# Patient Record
Sex: MALE | Race: WHITE | Employment: FULL TIME | URBAN - METROPOLITAN AREA
[De-identification: names, ages, dates, MRNs, and addresses within clinical notes are randomized per-mention and may not be internally consistent; named-entity substitution may affect disease eponyms.]

---

## 2022-01-13 ENCOUNTER — HOSPITAL ENCOUNTER (OUTPATIENT)
Age: 42
Setting detail: SPECIMEN
Discharge: HOME | End: 2022-01-13

## 2022-01-13 ENCOUNTER — OFFICE VISIT (OUTPATIENT)
Dept: PRIMARY CARE CLINIC | Age: 42
End: 2022-01-13
Payer: COMMERCIAL

## 2022-01-13 ENCOUNTER — TELEPHONE (OUTPATIENT)
Dept: PRIMARY CARE CLINIC | Age: 42
End: 2022-01-13

## 2022-01-13 VITALS
SYSTOLIC BLOOD PRESSURE: 135 MMHG | HEIGHT: 66 IN | HEART RATE: 92 BPM | BODY MASS INDEX: 21.79 KG/M2 | WEIGHT: 135.6 LBS | DIASTOLIC BLOOD PRESSURE: 97 MMHG | TEMPERATURE: 98.1 F | OXYGEN SATURATION: 97 %

## 2022-01-13 DIAGNOSIS — I10 ESSENTIAL HYPERTENSION: ICD-10-CM

## 2022-01-13 DIAGNOSIS — E11.00 TYPE 2 DIABETES MELLITUS WITH HYPEROSMOLARITY WITHOUT COMA, WITH LONG-TERM CURRENT USE OF INSULIN (HCC): ICD-10-CM

## 2022-01-13 DIAGNOSIS — Z13.9 ENCOUNTER FOR SCREENING INVOLVING SOCIAL DETERMINANTS OF HEALTH (SDOH): ICD-10-CM

## 2022-01-13 DIAGNOSIS — Z79.4 TYPE 2 DIABETES MELLITUS WITH HYPEROSMOLARITY WITHOUT COMA, WITH LONG-TERM CURRENT USE OF INSULIN (HCC): ICD-10-CM

## 2022-01-13 DIAGNOSIS — E87.6 HYPOKALEMIA: ICD-10-CM

## 2022-01-13 DIAGNOSIS — Z00.00 ENCOUNTER FOR MEDICAL EXAMINATION TO ESTABLISH CARE: Primary | ICD-10-CM

## 2022-01-13 DIAGNOSIS — Z13.1 DIABETES MELLITUS SCREENING: ICD-10-CM

## 2022-01-13 DIAGNOSIS — Z13.220 SCREENING FOR HYPERLIPIDEMIA: ICD-10-CM

## 2022-01-13 DIAGNOSIS — F79 INTELLECTUAL DISABILITY: ICD-10-CM

## 2022-01-13 DIAGNOSIS — Z02.1 DRUG SCREENING, PRE-EMPLOYMENT: ICD-10-CM

## 2022-01-13 DIAGNOSIS — Z11.59 ENCOUNTER FOR HEPATITIS C SCREENING TEST FOR LOW RISK PATIENT: ICD-10-CM

## 2022-01-13 LAB — HBA1C MFR BLD: 13.2 %

## 2022-01-13 PROCEDURE — 83036 HEMOGLOBIN GLYCOSYLATED A1C: CPT | Performed by: STUDENT IN AN ORGANIZED HEALTH CARE EDUCATION/TRAINING PROGRAM

## 2022-01-13 PROCEDURE — 99205 OFFICE O/P NEW HI 60 MIN: CPT | Performed by: STUDENT IN AN ORGANIZED HEALTH CARE EDUCATION/TRAINING PROGRAM

## 2022-01-13 PROCEDURE — G8427 DOCREV CUR MEDS BY ELIG CLIN: HCPCS | Performed by: STUDENT IN AN ORGANIZED HEALTH CARE EDUCATION/TRAINING PROGRAM

## 2022-01-13 PROCEDURE — G8420 CALC BMI NORM PARAMETERS: HCPCS | Performed by: STUDENT IN AN ORGANIZED HEALTH CARE EDUCATION/TRAINING PROGRAM

## 2022-01-13 PROCEDURE — G8484 FLU IMMUNIZE NO ADMIN: HCPCS | Performed by: STUDENT IN AN ORGANIZED HEALTH CARE EDUCATION/TRAINING PROGRAM

## 2022-01-13 PROCEDURE — 90715 TDAP VACCINE 7 YRS/> IM: CPT | Performed by: STUDENT IN AN ORGANIZED HEALTH CARE EDUCATION/TRAINING PROGRAM

## 2022-01-13 PROCEDURE — 2022F DILAT RTA XM EVC RTNOPTHY: CPT | Performed by: STUDENT IN AN ORGANIZED HEALTH CARE EDUCATION/TRAINING PROGRAM

## 2022-01-13 PROCEDURE — 3046F HEMOGLOBIN A1C LEVEL >9.0%: CPT | Performed by: STUDENT IN AN ORGANIZED HEALTH CARE EDUCATION/TRAINING PROGRAM

## 2022-01-13 PROCEDURE — 1036F TOBACCO NON-USER: CPT | Performed by: STUDENT IN AN ORGANIZED HEALTH CARE EDUCATION/TRAINING PROGRAM

## 2022-01-13 RX ORDER — GLUCOSAMINE HCL/CHONDROITIN SU 500-400 MG
CAPSULE ORAL
Qty: 100 STRIP | Refills: 1 | Status: SHIPPED | OUTPATIENT
Start: 2022-01-13 | End: 2022-11-03 | Stop reason: SDUPTHER

## 2022-01-13 RX ORDER — LISINOPRIL 5 MG/1
5 TABLET ORAL DAILY
Qty: 90 TABLET | Refills: 1 | Status: SHIPPED | OUTPATIENT
Start: 2022-01-13 | End: 2022-02-16 | Stop reason: SDUPTHER

## 2022-01-13 RX ORDER — INSULIN LISPRO 100 [IU]/ML
6 INJECTION, SOLUTION INTRAVENOUS; SUBCUTANEOUS
Qty: 4 EACH | Refills: 5 | Status: SHIPPED | OUTPATIENT
Start: 2022-01-13 | End: 2022-11-03 | Stop reason: SDUPTHER

## 2022-01-13 RX ORDER — INSULIN GLARGINE 100 [IU]/ML
20 INJECTION, SOLUTION SUBCUTANEOUS NIGHTLY
Qty: 5 PEN | Refills: 0 | Status: SHIPPED | OUTPATIENT
Start: 2022-01-13 | End: 2022-03-03 | Stop reason: SDUPTHER

## 2022-01-13 SDOH — ECONOMIC STABILITY: FOOD INSECURITY: WITHIN THE PAST 12 MONTHS, YOU WORRIED THAT YOUR FOOD WOULD RUN OUT BEFORE YOU GOT MONEY TO BUY MORE.: NEVER TRUE

## 2022-01-13 SDOH — ECONOMIC STABILITY: FOOD INSECURITY: WITHIN THE PAST 12 MONTHS, THE FOOD YOU BOUGHT JUST DIDN'T LAST AND YOU DIDN'T HAVE MONEY TO GET MORE.: NEVER TRUE

## 2022-01-13 ASSESSMENT — PATIENT HEALTH QUESTIONNAIRE - PHQ9
1. LITTLE INTEREST OR PLEASURE IN DOING THINGS: 0
SUM OF ALL RESPONSES TO PHQ QUESTIONS 1-9: 0
SUM OF ALL RESPONSES TO PHQ9 QUESTIONS 1 & 2: 0
2. FEELING DOWN, DEPRESSED OR HOPELESS: 0
SUM OF ALL RESPONSES TO PHQ QUESTIONS 1-9: 0

## 2022-01-13 ASSESSMENT — SOCIAL DETERMINANTS OF HEALTH (SDOH): HOW HARD IS IT FOR YOU TO PAY FOR THE VERY BASICS LIKE FOOD, HOUSING, MEDICAL CARE, AND HEATING?: NOT HARD AT ALL

## 2022-01-13 NOTE — PROGRESS NOTES
Visit Information    Have you changed or started any medications since your last visit including any over-the-counter medicines, vitamins, or herbal medicines? no   Are you having any side effects from any of your medications? -  no  Have you stopped taking any of your medications? Is so, why? -  no    Have you seen any other physician or provider since your last visit? No  Have you had any other diagnostic tests since your last visit? No  Have you been seen in the emergency room and/or had an admission to a hospital since we last saw you? No  Have you had your routine dental cleaning in the past 6 months? no    Have you activated your [employer] account? If not, what are your barriers?  No: Declined     Patient Care Team:  Carlos Monk MD as PCP - General (Internal Medicine)    Medical History Review  Past Medical, Family, and Social History reviewed and does not contribute to the patient presenting condition    Health Maintenance   Topic Date Due    Hepatitis C screen  Never done    DTaP/Tdap/Td vaccine (5 - Tdap) 10/26/1990    Depression Screen  Never done    HIV screen  Never done    Lipid screen  Never done    COVID-19 Vaccine (2 - Booster for Isai series) 01/07/2022    Flu vaccine (1) 01/13/2023 (Originally 9/1/2021)    Hepatitis A vaccine  Aged Out    Hepatitis B vaccine  Aged Out    Hib vaccine  Aged Out    Meningococcal (ACWY) vaccine  Aged Out    Pneumococcal 0-64 years Vaccine  Aged [place]

## 2022-01-13 NOTE — PATIENT INSTRUCTIONS
diphtheria, or pertussis, or has any severe, life-threatening allergies  · Has had a coma, decreased level of consciousness, or prolonged seizures within 7 days after a previous dose of any pertussis vaccine (DTP, DTaP, or Tdap)  · Has seizures or another nervous system problem  · Has ever had Guillain-Barré Syndrome (also called \"GBS\")  · Has had severe pain or swelling after a previous dose of any vaccine that protects against tetanus or diphtheria  In some cases, your health care provider may decide to postpone Tdap vaccination until a future visit. People with minor illnesses, such as a cold, may be vaccinated. People who are moderately or severely ill should usually wait until they recover before getting Tdap vaccine. Your health care provider can give you more information. Risks of a vaccine reaction  · Pain, redness, or swelling where the shot was given, mild fever, headache, feeling tired, and nausea, vomiting, diarrhea, or stomachache sometimes happen after Tdap vaccination. People sometimes faint after medical procedures, including vaccination. Tell your provider if you feel dizzy or have vision changes or ringing in the ears. As with any medicine, there is a very remote chance of a vaccine causing a severe allergic reaction, other serious injury, or death. What if there is a serious problem? An allergic reaction could occur after the vaccinated person leaves the clinic. If you see signs of a severe allergic reaction (hives, swelling of the face and throat, difficulty breathing, a fast heartbeat, dizziness, or weakness), call 9-1-1 and get the person to the nearest hospital.  For other signs that concern you, call your health care provider. Adverse reactions should be reported to the Vaccine Adverse Event Reporting System (VAERS). Your health care provider will usually file this report, or you can do it yourself. Visit the VAERS website at www.vaers. hhs.gov or call 8-641.312.5743.  [employer] is only for reporting reactions, and [facility] staff members do not give medical advice. The National Vaccine Injury Compensation Program  The National Vaccine Injury Compensation Program (VICP) is a federal program that was created to compensate people who may have been injured by certain vaccines. Claims regarding alleged injury or death due to vaccination have a time limit for filing, which may be as short as two years. Visit the VICP website at www.[facility].gov/vaccinecompensation or call 1-647.617.5719 to learn about the program and about filing a claim. How can I learn more? · Ask your health care provider. · Call your local or state health department. · Visit the website of the Food and Drug Administration (FDA) for vaccine package inserts and additional information at www.fda.gov/vaccines-blood-biologics/vaccines. · Contact the Centers for Disease Control and Prevention (CDC):  ? Call 9-801.330.7654 (1-800-CDC-INFO) or  ? Visit CDC's website at www.cdc.gov/vaccines. Vaccine Information Statement  Tdap (Tetanus, Diphtheria, Pertussis) Vaccine  8/6/2021  41  [id number]  [facility] for Disease Control and Prevention  Many vaccine information statements are available in French and other languages. See www.immunize.org/vis  Hojas de información sobre vacunas están disponibles en español y en muchos otros idiomas. Visite www.immunize.org/vis  Care instructions adapted under license by [facility] ([facility]). If you have questions about a medical condition or this instruction, always ask your healthcare professional. [place] [address] any warranty or liability for your use of this information. Patient Education        Learning About Meal Planning for Diabetes  Why plan your meals? Meal planning can be a key part of managing diabetes.  Planning meals and snacks with the right balance of carbohydrate, protein, and fat can help you keep your blood sugar at the target level you set with your doctor. You don't have to eat special foods. You can eat what your family eats, including sweets once in a while. But you do have to pay attention to how often you eat and how much you eat of certain foods. You may want to work with a dietitian or a certified diabetes educator. He or she can give you tips and meal ideas and can answer your questions about meal planning. This health professional can also help you reach a healthy weight if that is one of your goals. What plan is right for you? Your dietitian or diabetes educator may suggest that you start with the plate format or carbohydrate counting. The plate format  The plate format is a simple way to help you manage how you eat. You plan meals by learning how much space each food should take on a plate. Using the plate format helps you spread carbohydrate throughout the day. It can make it easier to keep your blood sugar level within your target range. It also helps you see if you're eating healthy portion sizes. To use the plate format, you put non-starchy vegetables on half your plate. Add meat or meat substitutes on one-quarter of the plate. Put a grain or starchy vegetable (such as brown rice or a potato) on the final quarter of the plate. You can add a small piece of fruit and some low-fat or fat-free milk or yogurt, depending on your carbohydrate goal for each meal.  Here are some tips for using the plate format:  · Make sure that you are not using an oversized plate. A 9-inch plate is best. Many restaurants use larger plates. · Get used to using the plate format at home. Then you can use it when you eat out. · Write down your questions about using the plate format. Talk to your doctor, a dietitian, or a diabetes educator about your concerns. Carbohydrate counting  With carbohydrate counting, you plan meals based on the amount of carbohydrate in each food.  Carbohydrate raises blood sugar higher and more quickly than any other nutrient. It is found in desserts, breads and cereals, and fruit. It's also found in starchy vegetables such as potatoes and corn, grains such as rice and pasta, and milk and yogurt. Spreading carbohydrate throughout the day helps keep your blood sugar levels within your target range. Your daily amount depends on several things, including your weight, how active you are, which diabetes medicines you take, and what your goals are for your blood sugar levels. A registered dietitian or diabetes educator can help you plan how much carbohydrate to include in each meal and snack. A guideline for your daily amount of carbohydrate is:  · 45 to 60 grams at each meal. That's about the same as 3 to 4 carbohydrate servings. · 15 to 20 grams at each snack. That's about the same as 1 carbohydrate serving. The Nutrition Facts label on packaged foods tells you how much carbohydrate is in a serving of the food. First, look at the serving size on the food label. Is that the amount you eat in a serving? All of the nutrition information on a food label is based on that serving size. So if you eat more or less than that, you'll need to adjust the other numbers. Total carbohydrate is the next thing you need to look for on the label. If you count carbohydrate servings, one serving of carbohydrate is 15 grams. For foods that don't come with labels, such as fresh fruits and vegetables, you'll need a guide that lists carbohydrate in these foods. Ask your doctor, dietitian, or diabetes educator about books or other nutrition guides you can use. If you take insulin, you need to know how many grams of carbohydrate are in a meal. This lets you know how much rapid-acting insulin to take before you eat. If you use an insulin pump, you get a constant rate of insulin during the day. So the pump must be programmed at meals to give you extra insulin to cover the rise in blood sugar after meals.   When you know how much carbohydrate you will eat, you can take the right amount of insulin. Or, if you always use the same amount of insulin, you need to make sure that you eat the same amount of carbohydrate at meals. If you need more help to understand carbohydrate counting and food labels, ask your doctor, dietitian, or diabetes educator. How can you plan healthy meals? Here are some tips to get started:  · Plan your meals a week at a time. Don't forget to include snacks too. · Use cookbooks or online recipes to plan several main meals. Plan some quick meals for busy nights. You also can double some recipes that freeze well. Then you can save half for other busy nights when you don't have time to cook. · Make sure you have the ingredients you need for your recipes. If you're running low on basic items, put these items on your shopping list too. · List foods that you use to make breakfasts, lunches, and snacks. List plenty of fruits and vegetables. · Post this list on the refrigerator. Add to it as you think of more things you need. · Take the list to the store to do your weekly shopping. Follow-up care is a key part of your treatment and safety. Be sure to make and go to all appointments, and call your doctor if you are having problems. It's also a good idea to know your test results and keep a list of the medicines you take. Where can you learn more? Go to [email or web address] and sign in to your QuotaDeck account. Enter W563 in the [facility] box to learn more about \"Learning About Meal Planning for Diabetes. \"     If you do not have an account, please click on the \"Sign Up Now\" link. Current as of: September 8, 2021               Content Version: 13.1  © 3580-6047 Healthwise, Incorporated. Care instructions adapted under license by [facility] ([facility]).  If you have questions about a medical condition or this instruction, always ask your healthcare professional. Eugenia Ruiz disclaims any warranty or liability for your use of this information. Patient Education        Diabetes and Alcohol: Care Instructions  Your Care Instructions     People who have diabetes need to be more careful with alcohol. Before you drink, consider a few things: Is your diabetes well controlled? Do you know how drinking alcohol can affect you? Do you have high blood pressure, nerve damage, or eye problems from your diabetes? If you take insulin or another medicine for diabetes, drinking alcohol may cause low blood sugar. This could cause dangerous low blood sugar levels. Too much alcohol can also affect your ability to know your blood sugar is low and to treat it. Drinking alcohol can make you lightheaded at first and drowsy as you drink more, both of which may be similar to the symptoms of low blood sugar. Drinking a lot of alcohol over a long period of time can damage your liver (cirrhosis). If this happens, your body may lose its natural response to protect itself from low blood sugar. If you are controlling your diabetes and do not have other health issues, it may be okay to have a drink once in a while. Learning how alcohol affects your body can help you make the right choices. Follow-up care is a key part of your treatment and safety. Be sure to make and go to all appointments, and call your doctor if you are having problems. It's also a good idea to know your test results and keep a list of the medicines you take. How can you care for yourself at home? If you drink  · Work with your doctor or other diabetes expert to find what is best for you. Make sure you know whether it is safe to drink if you are taking insulin or another medicine for diabetes. · In general, limit alcohol to 1 drink a day with a meal if you are a woman. If you are a man, limit alcohol to 2 drinks a day with a meal. The following is considered a standard drink:  ? One 12-ounce bottle of beer or wine cooler  ? One 5-ounce glass of wine  ?  One mixed drink with 1.5 ounces of 80-proof hard liquor, such as gin, whiskey, or rum  · Choose alcoholic drinks wisely. With hard alcohol, use sugar-free mixers, such as diet tonic, water, or club soda. Pick drinks that have less alcohol, including light beer or dry wine. Or add club soda to wine to dilute it. Also remember that most alcoholic drinks have a lot of calories. · When you drink, check your blood sugar before you go to bed. Have a snack before bed so your blood sugar does not drop while you sleep. When not to drink  · Never drink on an empty stomach. If you do drink alcohol, drink it only with a meal or snack. Having as little as 2 drinks on an empty stomach could lead to low blood sugar. · Do not drink alcohol if you have problems recognizing the signs of low blood sugar until they become severe. · Do not drink alcohol after you exercise. The exercise itself lowers blood sugar. · Do not drink if you have nerve damage. Drinking can make it worse and increase the pain, numbness, and other symptoms. · Do not drink if you have high blood pressure. · Do not drink if you have diabetic eye disease. · Do not drink if you have high triglycerides, a type of fat in your blood. Drinking can raise triglycerides. · Do not drink if you are trying to lose weight. Alcohol provides empty calories that do not give you any nutrients. · Do not drink and drive. The effects of alcohol are greater if you have low blood sugar. When should you call for help? Call 911 anytime you think you may need emergency care. For example, call if:    · You passed out (lost consciousness).     · You are confused or cannot think clearly.     · Your blood sugar is very high or very low. Watch closely for changes in your health, and be sure to contact your doctor if:    · Your blood sugar stays outside the level your doctor set for you.     · You have any problems. Where can you learn more?   Go to [email or web address] and sign in to your [email or web address] account. Enter T236 in the [facility] box to learn more about \"Diabetes and Alcohol: Care Instructions. \"     If you do not have an account, please click on the \"Sign Up Now\" link. Current as of: July 28, 2021               Content Version: 13.1  © 8917-0920 [employer]. Care instructions adapted under license by [facility] ([facility]). If you have questions about a medical condition or this instruction, always ask your healthcare professional. [place] [address] any warranty or liability for your use of this information. Patient Education        Diabetes Blood Sugar Emergencies: Your Action Plan  How can you prevent a blood sugar emergency? An important part of living with diabetes is keeping your blood sugar in your target range. You'll need to know what to do if it's too high or too low. Managing your blood sugar levels helps you avoid emergencies. This care sheet will teach you about the signs of high and low blood sugar. It will help you make an action plan with your doctor for when these signs occur. Low blood sugar is more likely to happen if you take certain medicines for diabetes. It can also happen if you skip a meal, drink alcohol, or exercise more than usual.  You may get high blood sugar if you eat differently than you normally do. One example is eating more carbohydrate than usual. Having a cold, the flu, or other sudden illness can also cause high blood sugar levels. Levels can also rise if you miss a dose of medicine. Any change in how you take your medicine may affect your blood sugar level. So it's important to work with your doctor before you make any changes. Track your blood sugar  Work with your doctor to fill in the blank spaces below that apply to you. Track your levels, know your target range, and write down ways you can get your blood sugar back in your target range.  A log book can help you track your levels. Take the book to all of your medical appointments. · Check your blood sugar _____ times a day, at these times:________________________________________________. (For example: Before meals, at bedtime, before exercise, during exercise, other.)  · Your blood sugar target range before a meal is ___________________. Your blood sugar target range after a meal is _______________________. · Do this--___________________________________________________--to get your blood sugar back within your safe range if your blood sugar results are _________________________________________. (For example: Less than 70 or above 250 mg/dL.)  Call your doctor when your blood sugar results are ___________________________________. (For example: Less than 70 or above 250 mg/dL.)  What are the symptoms of low and high blood sugar? Common symptoms of low blood sugar are sweating and feeling shaky, weak, hungry, or confused. Symptoms can start quickly. Common symptoms of high blood sugar are feeling very thirsty or very hungry. You may also pass urine more often than usual. You may have blurry vision and may lose weight without trying. But some people may have high or low blood sugar without having any symptoms. That's a good reason to check your blood sugar on a regular schedule. What should you do if you have symptoms? Work with your doctor to fill in the blank spaces below that apply to you. Low blood sugar and \"the rule of 15\"  If you have symptoms of low blood sugar, check your blood sugar. If it's below _____ ( for example, below 70), eat or drink a quick-sugar food that has about 15 grams of carbohydrate. Your goal is to get your level back to your safe range. Check your blood sugar again 15 minutes later. If it's still not in your target range, take another 15 grams of carbohydrate and check your blood sugar again in 15 minutes. Repeat this until you reach your target.  Then go back to your regular testing schedule. Children usually need less than 15 grams of carbohydrate. Check with your doctor or diabetes educator for the amount that is right for your child. When you have low blood sugar, it's best to stop or reduce any physical activity until your blood sugar is back in your target range and is stable. If you must stay active, eat or drink 30 grams of carbohydrate. Then check your blood sugar again in 15 minutes. If it's not in your target range, take another 30 grams of carbohydrates. Check your blood sugar again in 15 minutes. Keep doing this until you reach your target. You can then go back to your regular testing schedule. If your symptoms or blood sugar levels are getting worse or have not improved after 15 minutes, seek medical care right away. If you take insulin, always carry a glucagon emergency kit. Be sure your family, friends, and coworkers know how to give glucagon. Here are some examples of quick-sugar foods with 15 grams of carbohydrate:  · 3 or 4 glucose tablets  · 1 tablespoon (3 teaspoons) table sugar  · ½ cup to ¾ cup (4 to 6 ounces) of fruit juice or regular (not diet) soda  · Hard candy (such as 6 Life Savers)  High blood sugar  If you have symptoms of high blood sugar, check your blood sugar. Your goal is to get your level back to your target range. If it's above ______ ( for example, above 250), follow these steps:  · If you missed a dose of your diabetes medicine, take it now. Take only the amount of medicine that you have been prescribed. Do not take more or less medicine. · Give yourself insulin if your doctor has prescribed it for high blood sugar. · Test for ketones, if the doctor told you to do so. If the results of the ketone test show a moderate-to-large amount of ketones, call the doctor for advice. · Wait 30 minutes after you take the extra insulin or the missed medicine. Check your blood sugar again.   If your symptoms or blood sugar levels are getting worse or have not improved after taking these steps, seek medical care right away. Follow-up care is a key part of your treatment and safety. Be sure to make and go to all appointments, and call your doctor if you are having problems. It's also a good idea to know your test results and keep a list of the medicines you take. Where can you learn more? Go to [email or web address] and sign in to your Silecs account. Enter Z397 in the [facility] box to learn more about \"Diabetes Blood Sugar Emergencies: Your Action Plan. \"     If you do not have an account, please click on the \"Sign Up Now\" link. Current as of: July 28, 2021               Content Version: 13.1  © 2006-2021 Healthwise, [employer]. Care instructions adapted under license by [facility] ([facility]). If you have questions about a medical condition or this instruction, always ask your healthcare professional. [place] [address] any warranty or liability for your use of this information. Patient Education        Learning About Carbohydrate (Carb) Counting and Eating Out When You Have Diabetes  Why plan your meals? Meal planning can be a key part of managing diabetes. Planning meals and snacks with the right balance of carbohydrate, protein, and fat can help you keep your blood sugar at the target level you set with your doctor. You don't have to eat special foods. You can eat what your family eats, including sweets once in a while. But you do have to pay attention to how often you eat and how much you eat of certain foods. You may want to work with a dietitian or a certified diabetes educator. He or she can give you tips and meal ideas and can answer your questions about meal planning. This health professional can also help you reach a healthy weight if that is one of your goals. What should you know about eating carbs?   Managing the amount of carbohydrate (carbs) you eat is an important part of healthy meals when you have diabetes. Carbohydrate is found in many foods. · Learn which foods have carbs. And learn the amounts of carbs in different foods. ? Bread, cereal, pasta, and rice have about 15 grams of carbs in a serving. A serving is 1 slice of bread (1 ounce), ½ cup of cooked cereal, or 1/3 cup of cooked pasta or rice. ? Fruits have 15 grams of carbs in a serving. A serving is 1 small fresh fruit, such as an apple or orange; ½ of a banana; ½ cup of cooked or canned fruit; ½ cup of fruit juice; 1 cup of melon or raspberries; or 2 tablespoons of dried fruit. ? Milk and no-sugar-added yogurt have 15 grams of carbs in a serving. A serving is 1 cup of milk or 2/3 cup of no-sugar-added yogurt. ? Starchy vegetables have 15 grams of carbs in a serving. A serving is ½ cup of mashed potatoes or sweet potato; 1 cup winter squash; ½ of a small baked potato; ½ cup of cooked beans; or ½ cup cooked corn or green peas. · Learn how much carbs to eat each day and at each meal. A dietitian or CDE can teach you how to keep track of the amount of carbs you eat. This is called carbohydrate counting. · If you are not sure how to count carbohydrate grams, use the Plate Method to plan meals. It is a good, quick way to make sure that you have a balanced meal. It also helps you spread carbs throughout the day. ? Divide your plate by types of foods. Put non-starchy vegetables on half the plate, meat or other protein food on one-quarter of the plate, and a grain or starchy vegetable in the final quarter of the plate. To this you can add a small piece of fruit and 1 cup of milk or yogurt, depending on how many carbs you are supposed to eat at a meal.  · Try to eat about the same amount of carbs at each meal. Do not \"save up\" your daily allowance of carbs to eat at one meal.  · Proteins have very little or no carbs per serving.  Examples of proteins are beef, chicken, turkey, fish, eggs, tofu, cheese, cottage cheese, and peanut butter. A serving size of meat is 3 ounces, which is about the size of a deck of cards. Examples of meat substitute serving sizes (equal to 1 ounce of meat) are 1/4 cup of cottage cheese, 1 egg, 1 tablespoon of peanut butter, and ½ cup of tofu. How can you eat out and still eat healthy? · Learn to estimate the serving sizes of foods that have carbohydrate. If you measure food at home, it will be easier to estimate the amount in a serving of restaurant food. · If the meal you order has too much carbohydrate (such as potatoes, corn, or baked beans), ask to have a low-carbohydrate food instead. Ask for a salad or green vegetables. · If you use insulin, check your blood sugar before and after eating out to help you plan how much to eat in the future. · If you eat more carbohydrate at a meal than you had planned, take a walk or do other exercise. This will help lower your blood sugar. What are some tips for eating healthy? · Limit saturated fat, such as the fat from meat and dairy products. This is a healthy choice because people who have diabetes are at higher risk of heart disease. So choose lean cuts of meat and nonfat or low-fat dairy products. Use olive or canola oil instead of butter or shortening when cooking. · Don't skip meals. Your blood sugar may drop too low if you skip meals and take insulin or certain medicines for diabetes. · Check with your doctor before you drink alcohol. Alcohol can cause your blood sugar to drop too low. Alcohol can also cause a bad reaction if you take certain diabetes medicines. Follow-up care is a key part of your treatment and safety. Be sure to make and go to all appointments, and call your doctor if you are having problems. It's also a good idea to know your test results and keep a list of the medicines you take. Where can you learn more? Go to https://cirilo.United By Blue. org and sign in to your Digital Perceptiont account.  Enter A334 in the CONSTRVCT box to learn more about \"Learning About Carbohydrate (Carb) Counting and Eating Out When You Have Diabetes. \"     If you do not have an account, please click on the \"Sign Up Now\" link. Current as of: September 8, 2021               Content Version: 13.1  © 2006-2021 Healthwise, Catacel. Care instructions adapted under license by [facility] ([facility]). If you have questions about a medical condition or this instruction, always ask your healthcare professional. [address] any warranty or liability for your use of this information. Patient Education        Learning About Diabetes and Your Teeth  How does diabetes affect your teeth and gums? When you have diabetes, managing blood sugar levels and taking good care of your teeth and gums are both important. When blood sugar levels are high, there's a greater risk for:  · Gum (periodontal) disease. · Tooth decay. · Fungal infections in the mouth, like thrush. · Dry mouth, or xerostomia (say \"zee-ruh-STO-betzy-uh\"). The mouth needs saliva to neutralize the acids in your mouth. These acids can lead to gum disease and tooth decay. Keeping your blood sugar levels in your target range can help prevent problems with the teeth and gums. If you have any problems with your teeth or gums, see your dentist.  How do you care for your teeth and gums when you have diabetes? · Brush your teeth twice a day. · Floss daily. Make sure to press the floss against your teeth and not your gums. · Check each day for areas where your gums might be red or painful. Be sure to let your dentist know of any sores in your mouth. · See your dentist regularly for professional cleaning of your teeth and to look for gum problems. Many dentists recommend getting checkups twice a year. Remind your dentist that you have diabetes before any work is done. · Don't smoke or use smokeless tobacco. Tobacco use with diabetes can lead to a greater risk of severe gum disease.  If you need help quitting, talk to your doctor about stop-smoking programs and medicines. These can increase your chances of quitting for good. Follow-up care is a key part of your treatment and safety. Be sure to make and go to all appointments, and call your doctor if you are having problems. It's also a good idea to know your test results and keep a list of the medicines you take. Where can you learn more? Go to https://chpepiceweb.OwlTing ???. org and sign in to your LiveIntent account. Enter J565 in the iPerceptions box to learn more about \"Learning About Diabetes and Your Teeth. \"     If you do not have an account, please click on the \"Sign Up Now\" link. Current as of: July 28, 2021               Content Version: 13.1  © 0461-7869 Healthwise, [employer]ly [employer]. Care instructions adapted under license by [facility] ([facility]). If you have questions about a medical condition or this instruction, always ask your healthcare professional. [place] [address] any warranty or liability for your use of this information. Patient Education        Counting Carbohydrates for Diabetes: Care Instructions  Your Care Instructions     You don't have to eat special foods when you have diabetes. You just have to be careful to eat healthy foods. Carbohydrates (carbs) raise blood sugar higher and quicker than any other nutrient. Carbs are found in desserts, breads and cereals, and fruit. They're also in starchy vegetables. These include potatoes, corn, and grains such as rice and pasta. Carbs are also in milk and yogurt. The more carbs you eat at one time, the higher your blood sugar will rise. Spreading carbs all through the day helps keep your blood sugar levels within your target range. Counting carbs is one of the best ways to keep your blood sugar under control.   If you use insulin, counting carbs helps you match the right amount of insulin to the number of grams of carbs in a meal. Then you can change your diet and insulin dose as needed. Testing your blood sugar several times a day can help you learn how carbs affect your blood sugar. A registered dietitian or certified diabetes educator can help you plan meals and snacks. Follow-up care is a key part of your treatment and safety. Be sure to make and go to all appointments, and call your doctor if you are having problems. It's also a good idea to know your test results and keep a list of the medicines you take. How can you care for yourself at home? Know your daily amount of carbohydrates  Your daily amount depends on several things, such as your weight, how active you are, which diabetes medicines you take, and what your goals are for your blood sugar levels. A registered dietitian or certified diabetes educator can help you plan how many carbs to include in each meal and snack. For most adults, a guideline for the daily amount of carbs is:  · 45 to 60 grams at each meal. That's about the same as 3 to 4 carbohydrate servings. · 15 to 20 grams at each snack. That's about the same as 1 carbohydrate serving. Count carbs  Counting carbs lets you know how much rapid-acting insulin to take before you eat. If you use an insulin pump, you get a constant rate of insulin during the day. So the pump must be programmed at meals. This gives you extra insulin to cover the rise in blood sugar after meals. If you take insulin:  · Learn your own insulin-to-carb ratio. You and your diabetes health professional will figure out the ratio. You can do this by testing your blood sugar after meals. For example, you may need a certain amount of insulin for every 15 grams of carbs. · Add up the carb grams in a meal. Then you can figure out how many units of insulin to take based on your insulin-to-carb ratio. · Exercise lowers blood sugar. You can use less insulin than you would if you were not doing exercise. Keep in mind that timing matters.  If you exercise within 1 hour after a meal, your body may need less insulin for that meal than it would if you exercised 3 hours after the meal. Test your blood sugar to find out how exercise affects your need for insulin. If you do or don't take insulin:  · Look at labels on packaged foods. This can tell you how many carbs are in a serving. You can also use guides from the American Diabetes Association. · Be aware of portions, or serving sizes. If a package has two servings and you eat the whole package, you need to double the number of grams of carbohydrate listed for one serving. · Protein, fat, and fiber do not raise blood sugar as much as carbs do. If you eat a lot of these nutrients in a meal, your blood sugar will rise more slowly than it would otherwise. Eat from all food groups  · Eat at least three meals a day. · Plan meals to include food from all the food groups. The food groups include grains, fruits, dairy, proteins, and vegetables. · Talk to your dietitian or diabetes educator about ways to add limited amounts of sweets into your meal plan. · If you drink alcohol, talk to your doctor. It may not be recommended when you are taking certain diabetes medicines. Where can you learn more? Go to https://Systel Global HoldingspepicewThemBid.Anctu. org and sign in to your [employer] account. Enter X030 in the [facility] box to learn more about \"Counting Carbohydrates for Diabetes: Care Instructions. \"     If you do not have an account, please click on the \"Sign Up Now\" link. Current as of: July 28, 2021               Content Version: 13.1  © 2006-2021 Healthwise, PingSome. Care instructions adapted under license by [facility] ([facility]). If you have questions about a medical condition or this instruction, always ask your healthcare professional. [place] [address] any warranty or liability for your use of this information.          Patient Education        Diabetes Foot Health: Care Instructions  Your Care Instructions prevent calluses and cracks. But do not put the cream between your toes, and stop using any cream that causes a rash. ? Clean underneath your toenails carefully. Do not use a sharp object to clean underneath your toenails. Use the blunt end of a nail file or other rounded tool. ? Trim and file your toenails straight across to prevent ingrown toenails. Use a nail clipper, not scissors. Use an emery board to smooth the edges. · Change socks daily. Socks without seams are best, because seams often rub the feet. You can find socks for people with diabetes from specialty catalogs. · Look inside your shoes every day for things like gravel or torn linings, which could cause blisters or sores. · Buy shoes that fit well:  ? Look for shoes that have plenty of space around the toes. This helps prevent bunions and blisters. ? Try on shoes while wearing the kind of socks you will usually wear with the shoes. ? Avoid plastic shoes. They may rub your feet and cause blisters. Good shoes should be made of materials that are flexible and breathable, such as leather or cloth. ? Break in new shoes slowly by wearing them for no more than an hour a day for several days. Take extra time to check your feet for red areas, blisters, or other problems after you wear new shoes. · Do not go barefoot. Do not wear sandals, and do not wear shoes with very thin soles. Thin soles are easy to puncture. They also do not protect your feet from hot pavement or cold weather. · Have your doctor check your feet during each visit. If you have a foot problem, see your doctor. Do not try to treat an early foot problem at home. Home remedies or treatments that you can buy without a prescription (such as corn removers) can be harmful. · Always get early treatment for foot problems. A minor irritation can lead to a major problem if not properly cared for early. When should you call for help?    Call your doctor now or seek immediate medical care if:    · You have a foot sore, an ulcer or break in the skin that is not healing after 4 days, bleeding corns or calluses, or an ingrown toenail.     · You have blue or black areas, which can mean bruising or blood flow problems.     · You have peeling skin or tiny blisters between your toes or cracking or oozing of the skin.     · You have a fever for more than 24 hours and a foot sore.     · You have new numbness or tingling in your feet that does not go away after you move your feet or change positions.     · You have unexplained or unusual swelling of the foot or ankle. Watch closely for changes in your health, and be sure to contact your doctor if:    · You cannot do proper foot care. Where can you learn more? Go to https://euNetworks Group LimitedpeFlocastsluis albertoeb.Tioga Energy. org and sign in to your import.io account. Enter A739 in the Kromek box to learn more about \"Diabetes Foot Health: Care Instructions. \"     If you do not have an account, please click on the \"Sign Up Now\" link. Current as of: July 28, 2021               Content Version: 13.1  © 2006-2021 Fon. Care instructions adapted under license by [facility] ([facility]). If you have questions about a medical condition or this instruction, always ask your healthcare professional. [address] any warranty or liability for your use of this information. Patient Education        Learning About the Risk of Heart Attack and Stroke With Diabetes  How are diabetes, heart attack, and stroke connected? For some people, diabetes can cause problems that increase the risk of a heart attack or stroke. Many things can lead to a heart attack or stroke. These include high blood sugar, insulin resistance, high cholesterol, and high blood pressure. Lifestyle and genetics may also play a part. But here's the good news: The things you're doing to stay healthy with diabetes also help your heart and blood vessels.  That means eating healthy foods, quitting smoking, getting exercise, and staying at a healthy weight. What increases your risk for heart attack and stroke? When you have diabetes, your risk for heart attack and stroke is even higher if you have:  · High blood pressure. It pushes blood through the arteries with too much force. Over time, this damages the walls of the arteries. · High cholesterol. It causes the buildup of a kind of fat inside the blood vessel walls. This buildup can lower blood flow to the heart muscle and raise your risk for having a heart attack or stroke. · Kidney damage. It shares many of the risk factors for heart attack and stroke (such as high blood sugar, high blood pressure, and high cholesterol). How do you keep your heart healthy when you have diabetes? Managing your diabetes and keeping your heart and blood vessels healthy are both important. Here are some things you can do. · Test your blood sugar levels and get your diabetes tests on schedule. Try to keep your numbers within your target range. · Keep track of your blood pressure. Your doctor will give you a goal that's right for you. If your blood pressure is high, your treatment may also include medicine. Changes in your lifestyle, such as staying at a healthy weight, may also help you lower your blood pressure. · Eat heart-healthy foods. These include vegetables, fruit, nuts, beans, lean meat, fish, and whole grains. Limit sodium, alcohol, and sugar. · Work with your doctor or diabetes educator to learn which exercises are safe for you. Walking is a good choice. Bit by bit, increase the amount you walk every day. Try for at least 30 minutes on most days of the week. · Don't smoke. Smoking can make diabetes worse and increase your risk of heart attack or stroke. If you need help quitting, talk to your doctor about stop-smoking programs and medicines. These can increase your chances of quitting for good.   · Take medicines as directed by your doctor. For example, your doctor may suggest taking a statin or daily aspirin. Some diabetes medicine can also lower your risk for heart attack and stroke. Where can you learn more? Go to https://Phoenix New Mediajoy.Springbuk. org and sign in to your GotVoice account. Enter W348 in the [facility] box to learn more about \"Learning About the Risk of Heart Attack and Stroke With Diabetes. \"     If you do not have an account, please click on the \"Sign Up Now\" link. Current as of: 2020               Content Version: 13.1  © 3621-9671 One Beauty Stop. Care instructions adapted under license by [facility] ([facility]). If you have questions about a medical condition or this instruction, always ask your healthcare professional. [address] any warranty or liability for your use of this information. Patient Education        Home Blood Sugar Test: About This Test  What is it? A home blood sugar test measures the amount of sugar (glucose) in your blood, using a small device called a blood sugar meter. It's a quick way to test your blood sugar anywhere, at any time. Why is this test done? Testing your blood sugar helps you know if your levels are in your target range. It helps you know when to take action and may help you avoid blood sugar emergencies. Testing also helps you learn how things like exercise, stress, and what you eat can affect your blood sugar. What happens before the test?  The supplies you will need for testing blood sugar include:  · A blood glucose meter. · Testing strips. These are made to be used with a specific model of meter. Make sure the strips haven't . · Sugar control solutions. Some meters require a specific solution. Many new meters are made to operate without a control solution. · Short needles called lancets for pricking your skin. · A pen-sized humphrey for the lancet (lancet device).  It positions the lancet and controls how deeply it goes into your skin. · Clean cotton balls. These are used to stop the bleeding from the testing site. What happens during the test?  Checking your blood sugar involves pricking your finger, palm, or forearm with a lancet to collect a drop of blood. The blood drop is placed on a test strip, which you insert into the blood glucose meter. The instructions for testing are slightly different for each blood glucose meter model. Follow the instructions that came with your meter. · Wash your hands with warm, soapy water. Dry them well with a clean towel. You may also use an alcohol wipe to clean your finger or other site. But make sure your hands are dry before the test.  · Insert a clean lancet into the lancet device. · Remove a test strip from the test strip bottle. Replace the lid right away to keep moisture away from the other strips. · Follow the instructions that came with your meter to get it ready. · Use the lancet device to stick the side of your fingertip with the lancet. Do not stick the tip of your finger. Some blood sugar meters use lancet devices that take the blood sample from other sites, such as the palm of the hand or the forearm. But the finger is usually the most accurate place to test blood sugar. · Put a drop of blood on the correct spot on the test strip. · Apply pressure with a clean cotton ball to stop the bleeding. · Follow the directions that came with the meter to get the results. · Write down the results and the time that you tested your blood. Some meters will store the results for you. How long does the test take? The blood glucose meter will show the results of the test in a minute or less. What are the possible results for the test?  The American Diabetes Association (ADA) recommends that you stay within the following blood glucose level ranges. But depending on your health, you and your doctor may set a different range for you.   For nonpregnant adults with diabetes  · 80 milligrams per deciliter (mg/dL) to 130 mg/dL before a meal  · Less than 180 mg/dL 1 to 2 hours after a meal  For women who have diabetes and are pregnant  · 95 mg/dL or less before breakfast  · 120 to 140 mg/dL (or lower) 1 to 2 hours after a meal  Where can you learn more? Go to https://Planet SushipepicewOLED-T.Kelan. org and sign in to your FancyBox account. Enter W022 in the INTERNET BUSINESS TRADER box to learn more about \"Home Blood Sugar Test: About This Test.\"     If you do not have an account, please click on the \"Sign Up Now\" link. Current as of: 2021               Content Version: 13.1   [employer]cribe Wellness. Care instructions adapted under license by [facility] ([facility]). If you have questions about a medical condition or this instruction, always ask your healthcare professional. [place] [address] any warranty or liability for your use of this information. Patient Education        How to Give a Glucagon Shot: Care Instructions  What is glucagon? Glucagon is a hormone that raises blood sugar levels. It is made by the pancreas. It can also be given as a shot or as a powder that's sprayed into the nose. People with diabetes sometimes get very low blood sugar. If they are unconscious, they need sugar right away. Glucagon raises the blood sugar quickly. A person also needs glucagon if they can't (or won't) safely drink or eat something that contains sugar. If someone close to you has diabetes, you may need to give them the shot or spray during a low blood sugar emergency. Side effects may include nausea, vomiting, a headache, and a runny nose. Replace glucagon shots and nasal spray before they . And follow the directions for storage. How do you give the glucagon shot? A glucagon emergency kit has a syringe that contains liquid. The kit also has a bottle that contains the medicine, which is a powder.   1. Follow the instructions in the kit to mix the powder and the liquid. Put this back into the syringe. Make sure you have the amount of glucagon that the person's doctor recommends. 2. Choose a clean site for the shot on the buttock, upper arm, or thigh. If you have an alcohol swab, use it to clean the skin where you will give the shot. 3. Keep your fingers off the plunger. Hold the syringe like a pencil close to the site. 4. Quickly push the needle all the way into the site. 5. Push the plunger all the way in so that the medicine goes into the tissue. Remove the needle from the skin slowly and at the same angle that you put it in. Press the alcohol swab, if you used one, against the shot site. 6. Turn the person's head to the side. This will prevent choking if they vomit. 7. After you give the shot, immediately call 911 or other emergency services. If help has not arrived within 15 minutes and the person is still unconscious, give another glucagon shot. 8. When the person is alert and can swallow, give some glucose or sucrose tablets or quick-sugar food. Also give some long-acting source of carbohydrate, such as crackers and cheese or a meat sandwich. Stay with the person until emergency help arrives. Anytime a person who has diabetes gets glucagon, they should talk to a doctor to try to find out what caused the low blood sugar. Some causes include too much insulin, a missed meal, and insulin injected into a blood vessel. Other causes include an illness other than diabetes, liver damage, and kidney damage. Low blood sugar can also be caused by exercise or a new medicine. Where can you learn more? Go to https://Alpine Data [email or web address].com.Vocus Communications. org and sign in to your Zmags account. Enter S190 in the Pure Digital Technologies box to learn more about \"How to Give a Glucagon Shot: Care Instructions. \"     If you do not have an account, please click on the \"Sign Up Now\" link.   Current as of: July 28, 2021               Content Version: 13.1  © 2006-2021 Healthwise, MobileReactor. Care instructions adapted under license by [facility] ([facility]). If you have questions about a medical condition or this instruction, always ask your healthcare professional. [address] any warranty or liability for your use of this information. Patient Education        Learning About Insulin Pens  What is an insulin pen? An insulin pen is a device for giving insulin shots. It looks like a pen. You can set the dose of insulin with a dial on the outside of the pen. You use the pen to give the insulin shot (injection). Both disposable and reusable insulin pens are available. With a disposable pen, a set amount of insulin comes in the pen ready to use. When the insulin is used up, you throw the pen away. You use a new pen the next time you need insulin. With a reusable pen, you don't throw the pen away. Instead, you reload the pen with a pre-measured cartridge of insulin. When the insulin is used up, you insert a new cartridge into the pen. Disposable and reusable pens both need a new needle with each shot. The needles come in different lengths and widths. Hollister needles will prevent injecting into the muscle, especially in children or people who are lean. Thinner-width needles reduce the pricking sensation. Width is measured by gauge. The higher the number, the thinner the needle. Why do some people prefer pens? · Most people find that insulin pens are easier to use than a bottle and syringe. · Many people feel less pain (or no pain) with the smaller insulin pen needle, compared to a syringe needle. · Insulin pens may help you give yourself more accurate doses. When you draw insulin into a syringe, you must carefully measure so that you don't get too much or too little. But with a pen, you set a dial for the amount of insulin you want, and then you push the button.   · Insulin pens may work better than syringes for people who don't see well or who have problems like arthritis that make it harder to use a syringe. · Using an insulin pen draws less attention from others. You can give yourself insulin with fewer people noticing. · You don't need to carry insulin bottles and syringes everywhere you go. An insulin pen fits into a pocket or purse. What should you know about insulin pens? Each pen delivers a different brand and type (or types) of insulin. Some deliver rapid-acting insulin. Others deliver long-acting insulin. And some pens deliver a mixture of both in one shot. Pens have different colored labels, cartridge holders, or dosing knobs. Many pens have special features. For example, some pens have springs so that it takes less force to deliver a dose of insulin. Other pens have signals you can hear that let you know the insulin has been delivered. Some have memory to show the amount and time of the last dose. How do you use an insulin pen? 1. For a reusable pen, put the insulin cartridge into the pen. Disposable pens already have an insulin cartridge. Follow the directions for how to screw a new needle onto your pen. Before using cloudy insulin, such as NPH and premixed insulin, gently roll the pen between your palms 10 times, then tip the pen up and down 10 times. Do not shake the pen. The insulin should look milky white. 2. Remove the outer cap from the needle. Keep this cap to use later. 3. Remove the inner cover from the needle. Be careful not to prick yourself. 4. Before each shot, prime the needle. Priming removes air from the needle and helps make sure you get the right dose. Turn the dose knob to 2 units or to the amount that your pen's  recommends. Hold your pen with the needle pointing up. Tap the cartridge humphrey gently to move any air bubbles to the top. Push the injection button all the way in. Watch for a stream or drop of insulin to come out of the needle. If it does not, repeat this step again.   5. Clean the area of skin where you will give the shot. If you use alcohol to clean the skin, let it dry. Use a different spot each time you inject insulin. That's because using the same spot every time can cause bumps or pits to form in the skin. For example, inject your insulin above your belly button, then the next time use your upper thigh, and then the next time inject below your belly button. 6. Turn the dose knob to the number of units of insulin you need to inject. Push the needle into your skin. Most people can inject using a 90-degree angle and without pinching the skin. Adults and children who are very lean and people who use longer needles may need to pinch the skin to avoid injecting into muscle. 7. Put your thumb on the injection button and push it in until it stops. Keep the pen in your skin. Hold the dose knob in for 10 seconds (or to the number that the  recommends). Then pull the needle out of your skin. Do not rub the area. 8. Put only the outer cap back over the needle. The thin inner cover is harder to put back on, and you could stick yourself. 9. After covering the needle with the outer cap, unscrew the needle and throw it away in a sharps container or other solid plastic container. You can get a sharps container at your drugstore. 10. Always read the insulin package information that tells the best way to store your insulin pen and insulin cartridges. In general, unopened insulin for pens will last longer if it is kept in the refrigerator. After insulin is opened, most manufacturers say to store it at room temperature. Don't share insulin pens with anyone else who uses insulin. Even when the needle is changed, an insulin pen can carry bacteria or blood that can make another person sick. Where can you learn more? Go to https://[employer] Biotechnologiespesylvestereb.Heart Health. org and sign in to your [employer] account.  Enter M910 in the [employer] box to learn more about \"Learning About Insulin Pens.\"     If you do not have an account, please click on the \"Sign Up Now\" link. Current as of: July 28, 2021               Content Version: 13.1  © 2006-2021 Healthwise, Incorporated. Care instructions adapted under license by [facility] ([facility]). If you have questions about a medical condition or this instruction, always ask your healthcare professional. [address] any warranty or liability for your use of this information. Patient Education        Giving a Mixed-Dose Insulin Shot: Care Instructions  Overview     Insulin is normally made by the pancreas, a gland behind the stomach. In people with diabetes, the pancreas no longer makes enough insulin or it stops making it. Without insulin, your blood sugar level rises to dangerous levels. When this happens, you need insulin shots to keep your blood sugar in your target range. You may be nervous giving a shot at first. But soon, giving yourself a shot will become routine. It is quite easy to learn how to draw up insulin into a syringe and give the shot. The needles you use to give the insulin injections are very thin, and most people who have diabetes say that they do not even feel the needle enter the skin. Even if you do feel the injection, the sting of the shot is not bad and does not last long. Follow-up care is a key part of your treatment and safety. Be sure to make and go to all appointments, and call your doctor if you are having problems. It's also a good idea to know your test results and keep a list of the medicines you take. How can you care for yourself at home? Getting started  · Gather your supplies. You will need an insulin syringe, your bottles of insulin, and an alcohol wipe or a cotton ball dipped in alcohol. Make sure the types of insulin you're using can be mixed together. Keep your supplies in a bag or kit so you can carry the supplies wherever you go. · Check the labels on the bottles and contents.  Read and follow all instructions on the label, including how to store the insulin and how long the insulin will last.  · Wash your hands with soap and running water. Dry them well. Preparing the shot   For a mixed-dose insulin shot:  1. Roll the insulin bottles gently between your hands. This will warm the insulin if you have kept the bottle in the refrigerator. Roll the cloudy insulin bottle until the white powder has dissolved and the insulin is mixed. 2. Wipe the rubber lid of both insulin bottles with an alcohol wipe or a cotton ball dipped in alcohol. (If you are using a bottle for the first time, remove the protective cover over the rubber lid.) Let the top dry before you remove any insulin. 3. Remove the plastic cap from the needle on your insulin syringe. Take care not to touch the needle. 4. Pull the plunger back on your insulin syringe, and draw air into the syringe equal to the number of units of cloudy insulin to be given. 5. Push the needle of the syringe into the rubber lid of the cloudy insulin bottle. Push the plunger of the syringe to force the air into the bottle. This equalizes the pressure in the bottle when you later remove the dose of insulin. Remove the needle from the bottle, but do not draw up any insulin. 6. Pull the plunger of the syringe back and draw air into the syringe equal to the number of units of clear insulin to be given. 7. Push the needle of the syringe into the rubber lid of the clear insulin bottle. Push the plunger to force the air into the bottle. Leave the needle in the bottle. 8. Turn the bottle and syringe upside down, and hold them in one hand. Position the tip of the needle so that it is below the surface of insulin in the bottle. Pull back the plunger to fill the syringe with slightly more than the correct number of units of clear insulin to be given. 9. Tap the outside (barrel) of the syringe so that trapped air bubbles move into the needle area.  Push the air bubbles back into the bottle. Make sure that you have the correct number of units of insulin in your syringe. Remove the needle from the clear insulin bottle. 10. Insert the needle into the rubber lid of the cloudy insulin bottle. Do not push the plunger, because this would force clear insulin into your cloudy insulin bottle. If clear insulin is mixed in the bottle of cloudy, it will change the action of your other doses from that bottle. 11. Turn the bottle and syringe upside down and hold them in one hand. Position the tip of the needle so that it is below the surface of insulin in the bottle. Slowly pull back the plunger of the syringe to fill the syringe with the correct number of units of cloudy insulin to be given. This will keep air bubbles from entering the syringe. Remove the needle from the bottle. 12. You should now have the total number of units for the clear and cloudy insulin in your syringe. For example, if 10 units of clear and 15 units of cloudy are needed, you should have 25 units in your syringe. Now you are ready to give the shot. Giving the shot  Before giving your shot:  1. Use alcohol to clean the skin before you give the shot. Let it dry. 2. Slightly pinch a fold of skin between your fingers and thumb of one hand. 3. Hold the syringe like a pencil close to the site, keeping your fingers off the plunger. It is usually recommended to place the syringe at a 90-degree angle to the shot site, standing straight up from the skin. 4. Bend your wrist, and quickly push the needle all the way into the pinched-up area. 5. Push the plunger of the syringe all the way in so the insulin goes into the fatty tissue. 6. Take the needle out at the same angle that you inserted it. If you bleed a little, apply pressure over the shot area with your finger, a cotton ball, or a piece of gauze. Do not rub the area. 7. Replace the cover over the needle and dispose of the needle safely.  Do not use the same needle more than one time. Where to give the shot  You can inject insulin into:  · The belly, but at least 2 inches from the belly button. This is the best place to inject insulin because it quickly absorbs insulin. · The top outer part of the thighs. Insulin usually is absorbed more slowly from this site, unless you exercise soon after giving the shot. · The outside of the upper arms or the buttocks. You may need help giving shots in these areas. Your doctor may advise you to give your shots in different places on your body each day. This is called site rotation. Make sure you talk to your doctor about how to do this safely. If you rotate sites, use the same site at the same time of each day. For example, each day:  · At breakfast, give the shot in one of your arms. · At lunch, give the shot in one of your legs. · At dinner, give the shot in your belly. Slightly change the spot where you give an insulin shot each time you do it. For example, use five different places on the right upper arm, then use five places on the left upper arm. Using the same spot every time can cause bumps or pits in the skin and make the shots hurt more. It may also slow down how the insulin is absorbed into your body. Where can you learn more? Go to https://[employer]raw[employer] energy.[employer]. org and sign in to your [employer] account. Enter H306 in the [facility] box to learn more about \"Giving a Mixed-Dose Insulin Shot: Care Instructions. \"     If you do not have an account, please click on the \"Sign Up Now\" link. Current as of: July 28, 2021               Content Version: 13.1  © 1736-0150 Healthwise, Incorporated. Care instructions adapted under license by [facility] [employer] [facility] ([facility]). If you have questions about a medical condition or this instruction, always ask your healthcare professional. [address] any warranty or liability for your use of this information.

## 2022-01-13 NOTE — TELEPHONE ENCOUNTER
Pharmacy is requesting a change in hemalog prescription , would like to switch to pens and also pen needles ,dispening 15 ml , due to unable to break packaging to fill current prescription. Health Maintenance   Topic Date Due    Potassium monitoring  Never done    Creatinine monitoring  Never done    Hepatitis C screen  Never done    Depression Screen  Never done    HIV screen  Never done    Lipid screen  Never done    COVID-19 Vaccine (2 - Booster for [employer] series) 01/07/2022    Flu vaccine (1) 01/13/2023 (Originally 9/1/2021)    DTaP/Tdap/Td vaccine (6 - Td or Tdap) 01/13/2032    Hepatitis A vaccine  Aged Out    Hepatitis B vaccine  Aged Out    Hib vaccine  Aged Out    Meningococcal (ACWY) vaccine  Aged Out    Pneumococcal 0-64 years Vaccine  Aged Out             (applicable per patient's age: Cancer Screenings, Depression Screening, Fall Risk Screening, Immunizations)    Hemoglobin A1C (%)   Date Value   01/13/2022 13.2      (goal A1C is < 7)   (goal LDL is <100) need 30-50% reduction from baseline     BP Readings from Last 3 Encounters:   01/13/22 (!) 135/97    (goal /80)      All Future Testing planned in [facility]:  Lab Frequency Next Occurrence   Lipid, Fasting Once 81/83/9427   Basic Metabolic Panel Once 84/10/4743   CBC With Auto Differential Once 01/13/2022   Urine Drug Screen Once 01/13/2022   RUFUS-65 Autoantibody Once 01/13/2022   Insulin Antibody Once 01/13/2022   Urinalysis With Microscopic Once 01/13/2022   Hepatitis C Antibody Once 01/13/2022       Next Visit Date:  Future Appointments   Date Time Provider Ang Sanabria   2/16/2022 11:45 AM Jorge Wilcox  [address]            There is no problem list on file for this patient.

## 2022-01-13 NOTE — PROGRESS NOTES
History     Socioeconomic History    Marital status: Single     Spouse name: Not on file    Number of children: Not on file    Years of education: Not on file    Highest education level: Not on file   Occupational History    Not on file   Tobacco Use    Smoking status: Never Smoker    Smokeless tobacco: Never Used   Vaping Use    Vaping Use: Never used   Substance and Sexual Activity    Alcohol use: Never    Drug use: Never    Sexual activity: Not on file   Other Topics Concern    Not on file   Social History Narrative    Not on file     Social Determinants of Health     Financial Resource Strain: Low Risk     Difficulty of Paying Living Expenses: Not hard at all   Food Insecurity: No Food Insecurity    Worried About Running Out of Food in the Last Year: Never true    [address] in the Last Year: Never true   Transportation Needs:     Lack of Transportation (Medical): Not on file    Lack of Transportation (Non-Medical):  Not on file   Physical Activity:     Days of Exercise per Week: Not on file    Minutes of Exercise per Session: Not on file   Stress:     Feeling of Stress : Not on file   Social Connections:     Frequency of Communication with Friends and Family: Not on file    Frequency of Social Gatherings with Friends and Family: Not on file    Attends Quaker Services: Not on file    Active Member of [address] [place] [address] [employer] or Organizations: Not on file    Attends Club or Organization Meetings: Not on file    Marital Status: Not on file   Intimate Partner Violence:     Fear of Current or Ex-Partner: Not on file    Emotionally Abused: Not on file    Physically Abused: Not on file    Sexually Abused: Not on file   Housing Stability:     Unable to Pay for Housing in the Last Year: Not on file    Number of [place] in the Last Year: Not on file    Unstable Housing in the Last Year: Not on file        Family History   Family history unknown: Yes       Vitals:    01/13/22 1553   BP: (!) 126/92   Site: Left Upper Arm   Position: Sitting   Pulse: 91   Temp: 98.1 °F (36.7 °C)   TempSrc: Temporal   SpO2: 97%   Weight: 135 lb 9.6 oz (61.5 kg)   Height: 5' 6\" (1.676 m)     Estimated body mass index is 21.89 kg/m² as calculated from the following:    Height as of this encounter: 5' 6\" (1.676 m). Weight as of this encounter: 135 lb 9.6 oz (61.5 kg). Physical Exam  Vitals and nursing note reviewed. Constitutional:       Appearance: Normal appearance. HENT:      Head: Normocephalic and atraumatic. Eyes:      Extraocular Movements: Extraocular movements intact. Cardiovascular:      Rate and Rhythm: Normal rate and regular rhythm. Pulses: Normal pulses. Heart sounds: Normal heart sounds. Pulmonary:      Effort: Pulmonary effort is normal.      Breath sounds: Normal breath sounds. Abdominal:      General: Abdomen is flat. Palpations: Abdomen is soft. Musculoskeletal:         General: Normal range of motion. Cervical back: Normal range of motion and neck supple. Skin:     General: Skin is warm. Neurological:      General: No focal deficit present. Mental Status: He is alert and oriented to person, place, and time. ASSESSMENT/PLAN:  1. Encounter for medical examination to establish care      2. Type 2 diabetes mellitus with hyperosmolarity without coma, with long-term current use of insulin ([address]      3. Screening for hyperlipidemia    - Lipid, Fasting; Future    4. Hypokalemia      5.  Diabetes mellitus screening    - POCT glycosylated hemoglobin (Hb A1C)    UDS  Labs  His BG is out of control  Needs to be on basal bolus insulin  He has fear of needles and ID so I am concnered about adherence  Will have him see endocrinology as well for further counseling  Dietician too    I will have him see endocrinology as well if he needs to be on a premixed insulin or regular insulin prefer if they manage this  No Farxiga at this point I have suspicion for type 1 diabetes  Apparently when he was younger his mother tried to give him insulin injections so there is a strong suspicion for type 1 diabetes given the age of onset    We will order a aida and insulin antibody  He needs to see me in 1 month or sooner    I am referring to social work I hope they do get in contact with him    While a concerted effort was made to tackle all of his issues today he will likely require frequent visits  We need to ensure that he is compliant with his insulin  He is at very high risk for hospitalization for hyperglycemic emergency  I am also going to order a glucagon emergency kit  He will be given instructions on diabetes et al      No follow-ups on file. An  electronic signature was used to authenticate this note.     --Nathalia Yang MD on 1/13/2022 at 4:03 PM

## 2022-01-14 ENCOUNTER — TELEPHONE (OUTPATIENT)
Dept: PRIMARY CARE CLINIC | Age: 42
End: 2022-01-14

## 2022-01-14 LAB
-: ABNORMAL
AMORPHOUS: ABNORMAL
AMPHETAMINE SCREEN URINE: NEGATIVE
BACTERIA: ABNORMAL
BARBITURATE SCREEN URINE: NEGATIVE
BENZODIAZEPINE SCREEN, URINE: NEGATIVE
BILIRUBIN URINE: NEGATIVE
BUPRENORPHINE URINE: NORMAL
CANNABINOID SCREEN URINE: NEGATIVE
CASTS UA: ABNORMAL /LPF (ref 0–8)
COCAINE METABOLITE, URINE: NEGATIVE
COLOR: YELLOW
CRYSTALS, UA: ABNORMAL /HPF
EPITHELIAL CELLS UA: ABNORMAL /HPF (ref 0–5)
GLUCOSE URINE: ABNORMAL
KETONES, URINE: NEGATIVE
LEUKOCYTE ESTERASE, URINE: NEGATIVE
MDMA URINE: NORMAL
METHADONE SCREEN, URINE: NEGATIVE
METHAMPHETAMINE, URINE: NORMAL
MUCUS: ABNORMAL
NITRITE, URINE: NEGATIVE
OPIATES, URINE: NEGATIVE
OTHER OBSERVATIONS UA: ABNORMAL
OXYCODONE SCREEN URINE: NEGATIVE
PH UA: 5.5 (ref 5–8)
PHENCYCLIDINE, URINE: NEGATIVE
PROPOXYPHENE, URINE: NORMAL
PROTEIN UA: NEGATIVE
RBC UA: ABNORMAL /HPF (ref 0–4)
RENAL EPITHELIAL, UA: ABNORMAL /HPF
SPECIFIC GRAVITY UA: 1.05 (ref 1–1.03)
TEST INFORMATION: NORMAL
TRICHOMONAS: ABNORMAL
TRICYCLIC ANTIDEPRESSANTS, UR: NORMAL
TURBIDITY: CLEAR
URINE HGB: NEGATIVE
UROBILINOGEN, URINE: NORMAL
WBC UA: ABNORMAL /HPF (ref 0–5)
YEAST: ABNORMAL

## 2022-02-02 DIAGNOSIS — E11.9 INSULIN DEPENDENT TYPE 2 DIABETES MELLITUS (HCC): Primary | ICD-10-CM

## 2022-02-02 DIAGNOSIS — Z79.4 INSULIN DEPENDENT TYPE 2 DIABETES MELLITUS (HCC): Primary | ICD-10-CM

## 2022-02-08 ENCOUNTER — HOSPITAL ENCOUNTER (OUTPATIENT)
Dept: NUTRITION | Age: 42
Discharge: HOME | End: 2022-02-08

## 2022-02-08 RX ORDER — GLUCOSAM/CHON-MSM1/C/MANG/BOSW 500-416.6
TABLET ORAL
Qty: 100 EACH | Refills: 3 | Status: SHIPPED | OUTPATIENT
Start: 2022-02-08 | End: 2022-03-07 | Stop reason: SDUPTHER

## 2022-02-08 RX ORDER — UBIQUINOL 100 MG
CAPSULE ORAL
Qty: 100 EACH | Refills: 3 | Status: SHIPPED | OUTPATIENT
Start: 2022-02-08 | End: 2022-09-02

## 2022-02-08 NOTE — TELEPHONE ENCOUNTER
Health Maintenance   Topic Date Due    Potassium monitoring  Never done    Creatinine monitoring  Never done    Hepatitis C screen  Never done    Pneumococcal 0-64 years Vaccine (1 of 2 - PPSV23) Never done    Diabetic foot exam  Never done    Lipid screen  Never done    HIV screen  Never done    Diabetic microalbuminuria test  Never done    Diabetic retinal exam  Never done    Hepatitis B vaccine (1 of 3 - Risk 3-dose series) Never done    COVID-19 Vaccine (2 - Booster for [employer] series) 01/07/2022    Flu vaccine (1) 01/13/2023 (Originally 9/1/2021)    A1C test (Diabetic or Prediabetic)  04/13/2022    Depression Screen  01/13/2023    DTaP/Tdap/Td vaccine (6 - Td or Tdap) 01/13/2032    Hepatitis A vaccine  Aged Out    Hib vaccine  Aged Out    Meningococcal (ACWY) vaccine  Aged Out             (applicable per patient's age: Cancer Screenings, Depression Screening, Fall Risk Screening, Immunizations)    Hemoglobin A1C (%)   Date Value   01/13/2022 13.2      (goal A1C is < 7)   (goal LDL is <100) need 30-50% reduction from baseline     BP Readings from Last 3 Encounters:   01/13/22 (!) 135/97    (goal /80)      All Future Testing planned in [facility]:  Lab Frequency Next Occurrence   Lipid, Fasting Once 52/27/6648   Basic Metabolic Panel Once 71/26/4207   CBC With Auto Differential Once 04/21/2022   RUFUS-65 Autoantibody Once 04/21/2022   Insulin Antibody Once 04/21/2022   Hepatitis C Antibody Once 04/21/2022       Next Visit Date:  Future Appointments   Date Time Provider Ang Sanabria   2/16/2022 11:45 AM Caesar Multani MD [facility]            There is no problem list on file for this patient.

## 2022-02-08 NOTE — PROGRESS NOTES
Nutrition Note    Pt scheduled for nutrition counseling. Pt called after appointment, requested he reschedule, provided phone number for scheduling.      Electronically signed by Bola Edwards, MS, RD, LD on 2/8/22 at 11:02 AM EST    Contact: [phone]

## 2022-02-15 ENCOUNTER — HOSPITAL ENCOUNTER (OUTPATIENT)
Dept: NUTRITION | Age: 42
Discharge: HOME | End: 2022-02-15
Payer: COMMERCIAL

## 2022-02-15 PROCEDURE — 97802 MEDICAL NUTRITION INDIV IN: CPT

## 2022-02-15 NOTE — PROGRESS NOTES
OUTPATIENT NUTRITION COUNSELING       Branden Judd is a 42 y.o.  male     Pt seen for Outpatient Nutrition Counseling this morning at 10 am.  Pt with Diabetes and with questions about Diabetic diet. Pt reports UBW of 140 lb - reports he was losing weight but has been gaining back some weight as well. Pt reports he is staying at [facility] and is not able to choose what he receives for meals. Reports mainly having many carbohydrate heavy meals there. Pt states he always eats breakfast and that it is usually a big meal for him. Reports eating variable items for breakfast but always has some type of protein along with carbohydrates. Reports he does not usually eat lunch but if he does he has a snack. States he either has a big or small dinner based on what is the meal being served. Pt reports he does have a snack each evening and he likes junk food. Pt usually drinks mainly water with some juice or tea if he needs it. Discussed foods that are \"carbohydrates\" and common serving sizes of foods. Pt reports many of the foods he receives at meals are mainly carbohydrates. Discussed trying to pick certain items that have carbohydrates to eat at each meal.  Also discussed protein sources served at meals - pt reports he does not like many of the \"protein\" foods they serve and he does not eat many of them as they cause his blood sugars to spike. Discussed with pt that carbohydrate foods can cause blood sugars to rise but that protein foods generally do not have an affect on blood sugars and can instead help keep him full. Encouraged pt to try to eat some of whatever protein source is available at meals. Encouraged pt to watch portion sizes of snack foods and carbohydrate containing foods at meals. Provided handouts on a Diabetic diet, Carbohydrate Counting, and a Healthy diet. Pr receptive to education and information provided.   Noted pt has plans to meet with Diabetes Education (encouraged follow-up with Diabetes Education for questions about insulin and for additional diet education).        Rosalva Sarmiento RD, LD, MS  2/15/2022, 4:06 PM

## 2022-02-16 ENCOUNTER — OFFICE VISIT (OUTPATIENT)
Dept: PRIMARY CARE CLINIC | Age: 42
End: 2022-02-16
Payer: COMMERCIAL

## 2022-02-16 VITALS
WEIGHT: 140 LBS | HEART RATE: 107 BPM | DIASTOLIC BLOOD PRESSURE: 80 MMHG | TEMPERATURE: 97.9 F | BODY MASS INDEX: 22.6 KG/M2 | OXYGEN SATURATION: 94 % | SYSTOLIC BLOOD PRESSURE: 113 MMHG

## 2022-02-16 DIAGNOSIS — Z79.4 TYPE 2 DIABETES MELLITUS WITH HYPEROSMOLARITY WITHOUT COMA, WITH LONG-TERM CURRENT USE OF INSULIN (HCC): ICD-10-CM

## 2022-02-16 DIAGNOSIS — E11.00 TYPE 2 DIABETES MELLITUS WITH HYPEROSMOLARITY WITHOUT COMA, WITH LONG-TERM CURRENT USE OF INSULIN (HCC): ICD-10-CM

## 2022-02-16 DIAGNOSIS — E11.9 ENCOUNTER FOR DIABETIC FOOT EXAM (HCC): ICD-10-CM

## 2022-02-16 DIAGNOSIS — I10 ESSENTIAL HYPERTENSION: ICD-10-CM

## 2022-02-16 DIAGNOSIS — Z13.220 SCREENING FOR HYPERLIPIDEMIA: Primary | ICD-10-CM

## 2022-02-16 PROCEDURE — G8427 DOCREV CUR MEDS BY ELIG CLIN: HCPCS | Performed by: STUDENT IN AN ORGANIZED HEALTH CARE EDUCATION/TRAINING PROGRAM

## 2022-02-16 PROCEDURE — 2022F DILAT RTA XM EVC RTNOPTHY: CPT | Performed by: STUDENT IN AN ORGANIZED HEALTH CARE EDUCATION/TRAINING PROGRAM

## 2022-02-16 PROCEDURE — 99214 OFFICE O/P EST MOD 30 MIN: CPT | Performed by: STUDENT IN AN ORGANIZED HEALTH CARE EDUCATION/TRAINING PROGRAM

## 2022-02-16 PROCEDURE — G8420 CALC BMI NORM PARAMETERS: HCPCS | Performed by: STUDENT IN AN ORGANIZED HEALTH CARE EDUCATION/TRAINING PROGRAM

## 2022-02-16 PROCEDURE — 3046F HEMOGLOBIN A1C LEVEL >9.0%: CPT | Performed by: STUDENT IN AN ORGANIZED HEALTH CARE EDUCATION/TRAINING PROGRAM

## 2022-02-16 PROCEDURE — 1036F TOBACCO NON-USER: CPT | Performed by: STUDENT IN AN ORGANIZED HEALTH CARE EDUCATION/TRAINING PROGRAM

## 2022-02-16 PROCEDURE — G8484 FLU IMMUNIZE NO ADMIN: HCPCS | Performed by: STUDENT IN AN ORGANIZED HEALTH CARE EDUCATION/TRAINING PROGRAM

## 2022-02-16 RX ORDER — GLUCOSAMINE HCL/CHONDROITIN SU 500-400 MG
CAPSULE ORAL
Qty: 100 STRIP | Refills: 5 | Status: SHIPPED | OUTPATIENT
Start: 2022-02-16

## 2022-02-16 RX ORDER — DAPAGLIFLOZIN 10 MG/1
TABLET, FILM COATED ORAL
COMMUNITY
Start: 2022-01-13 | End: 2022-02-16 | Stop reason: SDUPTHER

## 2022-02-16 RX ORDER — PEN NEEDLE, DIABETIC 30 GX3/16"
NEEDLE, DISPOSABLE MISCELLANEOUS
COMMUNITY
Start: 2022-02-08 | End: 2022-03-08 | Stop reason: SDUPTHER

## 2022-02-16 RX ORDER — DAPAGLIFLOZIN 10 MG/1
10 TABLET, FILM COATED ORAL EVERY MORNING
Qty: 90 TABLET | Refills: 5 | Status: SHIPPED | OUTPATIENT
Start: 2022-02-16 | End: 2022-11-03 | Stop reason: SDUPTHER

## 2022-02-16 RX ORDER — LISINOPRIL 5 MG/1
5 TABLET ORAL DAILY
Qty: 90 TABLET | Refills: 1 | Status: SHIPPED | OUTPATIENT
Start: 2022-02-16 | End: 2022-09-02

## 2022-02-16 NOTE — PROGRESS NOTES
Jeramie Lewis (:  1980) is a 42 y.o. male,Established patient, here for evaluation of the following chief complaint(s):  Follow-up         ASSESSMENT/PLAN:  1. Screening for hyperlipidemia  -     Lipid, Fasting; Future  2. Type 2 diabetes mellitus with hyperosmolarity without coma, with long-term current use of insulin (HCC)  -     Microalbumin, Ur; Future  -     [facility] - Pamela Barr, DPM, Podiatry, [place] ([place])  3. Encounter for diabetic foot exam ([facility] [address]  -     [address], [facility], DPM, Podiatry, [place] ([facility])    Needs foot exam  Needs eye exam  Cont same dose insulin  Ok to take humalog twice daily  Diet is not ideal bc of SDH, eats at shelter    No follow-ups on file. Subjective   SUBJECTIVE/OBJECTIVE:  HPI: 42 M DMII on insulin    Started last visit    lantus 20 qhs  6 tid humalog  Sometimes using humalog twice daily only  Otherwise doing well, eating right        Review of Systems Pertinent positives and negatives included in HPI 14 point ROS otherwise negative         Objective                An electronic signature was used to authenticate this note.     --Magda Harmon MD

## 2022-02-18 ENCOUNTER — HOSPITAL ENCOUNTER (OUTPATIENT)
Dept: DIABETES SERVICES | Age: 42
Setting detail: THERAPIES SERIES
Discharge: HOME | End: 2022-02-18
Payer: COMMERCIAL

## 2022-02-18 DIAGNOSIS — Z79.4 TYPE 2 DIABETES MELLITUS WITH HYPERGLYCEMIA, WITH LONG-TERM CURRENT USE OF INSULIN (HCC): Primary | ICD-10-CM

## 2022-02-18 DIAGNOSIS — E11.65 TYPE 2 DIABETES MELLITUS WITH HYPERGLYCEMIA, WITH LONG-TERM CURRENT USE OF INSULIN (HCC): Primary | ICD-10-CM

## 2022-02-18 PROCEDURE — G0108 DIAB MANAGE TRN  PER INDIV: HCPCS

## 2022-02-18 SDOH — ECONOMIC STABILITY: FOOD INSECURITY: ADDITIONAL INFORMATION: NO

## 2022-02-18 ASSESSMENT — PROBLEM AREAS IN DIABETES QUESTIONNAIRE (PAID)
COPING WITH COMPLICATIONS OF DIABETES: 2
FEELING SCARED WHEN YOU THINK ABOUT LIVING WITH DIABETES: 4
FEELING THAT DIABETES IS TAKING UP TOO MUCH OF YOUR MENTAL AND PHYSICAL ENERGY EVERY DAY: 0
PAID-5 TOTAL SCORE: 8
WORRYING ABOUT THE FUTURE AND THE POSSIBILITY OF SERIOUS COMPLICATIONS: 1
FEELING DEPRESSED WHEN YOU THINK ABOUT LIVING WITH DIABETES: 1

## 2022-02-18 NOTE — LETTER
[facility]  [address]  [address] [place] [id number]  Phone: [phone]         February 18, 2022    To :Alyssia Jara MD    From: Melissa Garcia RN    Patient: Jim Cueva   YOB: 1980   Date of Visit: 2/18/22     An initial assessment to determine diabetes education needs was completed. Education plan includes :interpretation of lab results, blood sugar goals, complications of diabetes mellitus, hypoglycemia prevention and treatment, exercise, illness management, self-monitoring of blood glucose skills, nutrition, carbohydrate counting, site rotation, use of insulin pen and self-injection of insulin. An ongoing plan was created to include: follow up in 2 weeks    Thank you for the opportunity to provide Diabetes Self Management Education to your patient.

## 2022-02-18 NOTE — PROGRESS NOTES
Diabetes Self- Management Education Program Assessment -   Also see Diabetic Screening  Patient, Samra Tijerina,  here for diabetes self-management education  visit/ assessment. Today's visit was in an individual setting. MEDICAL HISTORY:  No past medical history on file. Family History   Family history unknown: Yes     Patient has no known allergies. Immunization History   Administered Date(s) Administered    COVID-19, J&J, PF, 0.5 mL 11/12/2021    DTP 07/11/1980, 09/05/1980, 11/05/1980, 11/30/1981    MMR 04/17/1981    Polio OPV 07/11/1980, 09/05/1980, 11/05/1980, 11/30/1981    Tdap (Boostrix, Adacel) 01/13/2022     Current Medications  Current Outpatient Medications   Medication Sig Dispense Refill    Insulin Pen Needle (PEN NEEDLES) 32G X 4 MM MISC       lisinopril (PRINIVIL;ZESTRIL) 5 MG tablet Take 1 tablet by mouth daily 90 tablet 1    metFORMIN (GLUCOPHAGE) 500 MG tablet Take 1 tablet by mouth daily (with breakfast) 90 tablet 5    FARXIGA 10 MG tablet Take 1 tablet by mouth every morning 90 tablet 5    blood glucose monitor strips Test 3 times a day & as needed for symptoms of irregular blood glucose. Dispense sufficient amount for indicated testing frequency plus additional to accommodate PRN testing needs. 100 strip 5    TRUEplus Lancets 33G MISC USE AS DIRECTED TO TEST BLOOD SUGAR TWO TIMES A  each 3    Alcohol Swabs (ALCOHOL PREP) 70 % PADS USE AS DIRECTED TWO TIMES A  each 3    blood glucose monitor kit and supplies Dispense sufficient amount for indicated testing frequency plus additional to accommodate PRN testing needs. Dispense all needed supplies to include: monitor, strips, lancing device, lancets, control solutions, alcohol swabs. 1 kit 0    insulin glargine (LANTUS SOLOSTAR) 100 UNIT/ML injection pen Inject 20 Units into the skin nightly 5 pen 0    blood glucose monitor strips Test 3 times a day & as needed for symptoms of irregular blood glucose.  Dispense sufficient amount for indicated testing frequency plus additional to accommodate PRN testing needs. 100 strip 1    insulin lispro (HUMALOG) 100 UNIT/ML injection cartridge Inject 6 Units into the skin 3 times daily (before meals) 4 each 5     No current facility-administered medications for this encounter.   :     Comments:  Allergies:  No Known Allergies      A1C blood level - at goal < 7%   Lab Results   Component Value Date    LABA1C 13.2 01/13/2022     No results found for: GLUF, LABMICR, LDLCALC, CREATININE    Blood pressure ( 130/ 80)  Or less  BP Readings from Last 3 Encounters:   02/16/22 113/80   01/13/22 (!) 135/97        Cholesterol ( LDL under  100)   No results found for: [address], LDLCHOLESTEROL, LDLDIRECT    Diabetes Self- Management Education Record    Participant Name: Sana Rivas  Referring Provider: Maximiliano Hernández MD   Assessment/Evaluation Ratings:  1=Needs Instruction   4=Demonstrates Understanding/Competency  2=Needs Review   NC=Not Covered    3=Comprehends Key Points  N/A=Not Applicable  Topics/Learning Objectives Pre-session Assess Date:  2/18/22rs    Instr. Date Reinforce Date Post- session Eval Comments   Diabetes disease process & Treatment process: Define diabetes & pre-diabetes; Identify own type of diabetes; role of the pancreas; signs/symptoms; diagnostic criteria; prevention & treatment options; contributing factors. 1    2/18/22rs - family hx of dm - but not very heavy - keep at lower wt and unsure why got DM now  Found out about dm in Jan 2022    Express low health literacy - prefers discussion rather than reading   Incorporating nutritional management into lifestyle: Describe effect of type, amount & timing of food on blood glucose;  Describe basic meal planning techniques & current nutrition guideline   1  Having about 2 meals per day    VOA- house now on [employer]     BK- cereals or muffins - some times eggs-     Estefani - sometimes skipped      DN- some meals more carbs - will keep smaller portions - just found out corn was Cho and this was upsetting    Now working on give up junk food   8;30 - 10pm will only one portion of like peanut butter and jelly   Stay away from juice  - drinks mostly water         What to eat - Food groups, When to eat - timing of meals and snacks, and How much to eat - portions control. calories/ day   CHO choices/ meal   CHO choices/  day   grams of protein /day   gram of fat /day     Correctly read food labels & demonstrate CHO counting & portion control with personalized meal plan. Identify dining out strategies, & dietary sick day guidelines. 1       Incorporating physical activity into lifestyle:   Verbalize effect of exercise on blood glucose levels; benefits of regular exercise; safety considerations; contraindications; maintenance of activity. 1    Prior was very active when he working - now less active -  At [facility]. 15 and working on getting class done   Using medications safely:  Identify effects of diabetes medicines on blood glucose levels; List diabetes medication taken, action & side effects;    1    2/18/22rs Metformin and  Kathey Senna  started in Jan and taking daily - get meds from desk from at [facility]   Insulin / Injectable - Appropriate injection sites; proper storage; supplies needed; proper technique; safe needle disposal guidelines. 1    Add insulin taking both lantus 20 units nightly and Humalog at 6 units with meals - offered clarification on when to take the insulin    Monitoring blood glucose, interpreting and using results:  Identify recommended & personal blood glucose targets; importance of testing; testing supplies; HgbA1C target levels; Factors affecting blood glucose;  Importance of logging blood glucose levels for pattern recognition; ketone testing; safe lancet disposal.   1    Keeping BG log for PCP   Prevention, detection & treatment of acute complications:  Identify symptoms of hyper & hypoglycemia, and prevention & treatment strategies. 1    2/18/22rs - No - not often under 70 - lowest on record was 2/1 he was to 66 - - then he did drink juice    Describe sick day guidelines & indications for  physician notification. Identify short term consequences of poor control. Disaster preparedness strategies    1       Prevention, detection & treatment of chronic complications:  Define the natural course of diabetes & describe the relationship of blood glucose levels to long term complications of diabetes. Identify preventative measures & standards of care. 1    - needs to have foot exam - podiatry - stated he is often out and about   - needs to have eye exam - [facility] appt for this week   - needs full labs / cholesterol check   Developing strategies to address psychosocial issues:  Describe feelings about living with diabetes; Describe how stress, depression & anxiety affect blood glucose; Identify coping strategies; Identify support needed & support network available. 1    Paid 8 - not had mental health support currently  - in [place], prior he stated he had some help - willing to talk to some one - expressed he has short temper    Developing strategies to promote health/change behavior: Identify 7 self-care behaviors; Personal health risk factors; Benefits, challenges & strategies for behavioral change;    1         Individualized goal selection. My goal , to help me improve my health, I will:   1. Healthy eating - try to eat CHO in meals - limited amount of corn and potatoes       2. Learn more about medication, how and when to take and how the medications work         Plan  Follow-up Appointments planned face to face     Instruction Method: [x]Lecture/Discussion  []Power Point Presentation  [x]Handouts  []Return Demonstration    Education Materials/Equipment Provided (VIA Mail for phone visits)  :    [x]Self-Management - Initial assessment - Enrolment in to [facility]  Where do I Begin, Living with Type 2 diabetes [facility] support program and  handout on diabetes education classes. -- 2/18/22rs     []Self-Management  Class 1 -Self-Management  Class 1 - \"How to Thrive: A guide for Your Journey with Diabetes\" ADA booklet 2020 -pages 4, 11- 15 , 18 -23   o one day food diary and envelope for return of diet HX   o  You tube and website resource sheet-Understanding Type 2 Diabetes from Animated Diabetes Patient [email or web address]      [] Self-Management  Class 2 - Meal Plan and handout for serving sizes, smarter snacking, Ready Set Carb Counting / Plate Method, Nutrient Conversion and International Diabetes [address] Eating for People with Diabetes and Nutrition in the [employer] Resources - fast facts about fast food and \"How to Thrive: A guide for Your journey with Diabetes\" - ADA booklet 2020  - pages 12 -17    [] Self-Management  Class 3 -   \"How to Thrive: A guide for Your Journey with Diabetes\"  pages 6- 9 &  21 - 28,  type 2 diabetes and the role of GLP- 1,  Individualized Diabetes report card     [] Self-Management Class 4 - BD Booklet  Sick Day Rules and  Dinning Out Guide , recipe hand outs and tips, diabetes Cookbooks  ( when available), & \"How to Thrive: A guide for Your journey with Diabetes\" - ADA booklet 2020  - pages 36 -39     []Self-Management - 3 month follow -  AADE7 Self care behaviors work sheets,  Online resource list - March 2020 ,  How to Thrive: A guide for Your journey with Diabetes\" - ADA booklet 2020  - pages 39. []Self-Management  Gestational - RN class -Resource materials sent out : care booklet - \" Gestational Diabetes Mellitus ( GDM) toolkit form [place] gestational diabetes postpartum care learning collaborative 2018. \"Simple Guidelines for meal planning with gestational diabetes. SMBG sheets to fax back to M weekly. BD  healthy injection site selection and rotation with 6 mm insulin syringe and 4 mm pen needle. Gestational diabetes handout from [facility] 2016.  Did you have gestational diabetes when you were pregnant? Handout from [facility]  April 2014    []Self-Management Gestational - RD class - My Food Plan for Gestational diabetes    []Glucose Meter     []Insulin Kit     []Other      Encounter Type Date Start Time End Time Comments No Show Dates   Assessment        []In Person  []Telephone    Class 1 - Understanding diabetes     []TelephoneAmerican Diabetes Association  www. diabetes. org    Class 2- Nutrition and diabetes      []Telephone  Healthy Eating with Diabetes- Automatic Data of Diabetes and Digestive and Kidney   [email or web address]    Class 3 - Preventing Complications     []Telephone    Class 4 -  In depth Nutrition and sick day care    []Telephone  Diabetes Food hub  www. diabetesfoodhub.org     Class 5 - 3 month follow up / goal reassessment        Gestational - RN         Gestational - RD        Individual MNT         Shared Med Appt         Yearly Follow-up        Meter Instrx      How to Measure Your Blood Sugar - [facility] Patient Education  [email or web address]    Insulin Instrx      []Pen  []Vial & Syringe   BD Diabetes Care: How to Inject Insulin with a Pen Needle  [email or web address]    Diabetes Care: How to Inject Insulin with a Syringe  [email or web address] be/[email or web address]       DSMS Support :   [] MNT      [] Annual update     [] Starting Fresh  adults living with diabetes or pre diabetes. [address] [place] [address] [phone] call for dates    []  Diabetes Group at  [address] [place] - Free 6 week diabetes education support   classes - use web site interest form found at  [employer].pt - to enroll       []ADA  Where do I Begin, Living with Type 2 diabetes ADA home support program  Web site: diabetes. org/living    Call: 1800 DIABETES  e-mail: Ahava@[employer]. org     []  Internet web sites - ADAWeb site: diabetes. org and diabetesfoodhub.org      Post Education Referrals:      [] [place] Tobacco Quit information sheet and [address] [facility]       [] Dental care - [facility] of [facility]     [] [facility] ([facility]) link  phone number - for information and referral to Layne Morrow  Clinically  [address], WEIGHT MANAGEMENT        []Esha Carter, RN

## 2022-03-01 ENCOUNTER — HOSPITAL ENCOUNTER (OUTPATIENT)
Dept: DIABETES SERVICES | Age: 42
Setting detail: THERAPIES SERIES
Discharge: HOME | End: 2022-03-01
Payer: COMMERCIAL

## 2022-03-01 DIAGNOSIS — E11.00 TYPE 2 DIABETES MELLITUS WITH HYPEROSMOLARITY WITHOUT COMA, WITHOUT LONG-TERM CURRENT USE OF INSULIN (HCC): Primary | ICD-10-CM

## 2022-03-01 PROCEDURE — G0108 DIAB MANAGE TRN  PER INDIV: HCPCS

## 2022-03-01 NOTE — PROGRESS NOTES
Diabetes Self- Management Education Program Assessment -   Also see Diabetic Screening  Patient, Juan Alberto Castro,  here for diabetes self-management education  visit/ assessment. Today's visit was in an individual setting. MEDICAL HISTORY:  No past medical history on file. Family History   Family history unknown: Yes     Patient has no known allergies. Immunization History   Administered Date(s) Administered    COVID-19, J&J, PF, 0.5 mL 11/12/2021    DTP 07/11/1980, 09/05/1980, 11/05/1980, 11/30/1981    MMR 04/17/1981    Polio OPV 07/11/1980, 09/05/1980, 11/05/1980, 11/30/1981    Tdap (Boostrix, Adacel) 01/13/2022     Current Medications  Current Outpatient Medications   Medication Sig Dispense Refill    Insulin Pen Needle (PEN NEEDLES) 32G X 4 MM MISC       lisinopril (PRINIVIL;ZESTRIL) 5 MG tablet Take 1 tablet by mouth daily 90 tablet 1    metFORMIN (GLUCOPHAGE) 500 MG tablet Take 1 tablet by mouth daily (with breakfast) 90 tablet 5    FARXIGA 10 MG tablet Take 1 tablet by mouth every morning 90 tablet 5    blood glucose monitor strips Test 3 times a day & as needed for symptoms of irregular blood glucose. Dispense sufficient amount for indicated testing frequency plus additional to accommodate PRN testing needs. 100 strip 5    TRUEplus Lancets 33G MISC USE AS DIRECTED TO TEST BLOOD SUGAR TWO TIMES A  each 3    Alcohol Swabs (ALCOHOL PREP) 70 % PADS USE AS DIRECTED TWO TIMES A  each 3    blood glucose monitor kit and supplies Dispense sufficient amount for indicated testing frequency plus additional to accommodate PRN testing needs. Dispense all needed supplies to include: monitor, strips, lancing device, lancets, control solutions, alcohol swabs. 1 kit 0    insulin glargine (LANTUS SOLOSTAR) 100 UNIT/ML injection pen Inject 20 Units into the skin nightly 5 pen 0    blood glucose monitor strips Test 3 times a day & as needed for symptoms of irregular blood glucose.  Dispense sufficient amount for indicated testing frequency plus additional to accommodate PRN testing needs. 100 strip 1    insulin lispro (HUMALOG) 100 UNIT/ML injection cartridge Inject 6 Units into the skin 3 times daily (before meals) 4 each 5     No current facility-administered medications for this encounter.   :     Comments:  Allergies:  No Known Allergies      A1C blood level - at goal < 7%   Lab Results   Component Value Date    LABA1C 13.2 01/13/2022     No results found for: GLUF, LABMICR, LDLCALC, CREATININE    Blood pressure ( 130/ 80)  Or less  BP Readings from Last 3 Encounters:   02/16/22 113/80   01/13/22 (!) 135/97        Cholesterol ( LDL under  100)   No results found for: [address], LDLCHOLESTEROL, LDLDIRECT    Diabetes Self- Management Education Record    Participant Name: Marco Antonio Penn  Referring Provider: Caesar Multani MD   Assessment/Evaluation Ratings:  1=Needs Instruction   4=Demonstrates Understanding/Competency  2=Needs Review   NC=Not Covered    3=Comprehends Key Points  N/A=Not Applicable  Topics/Learning Objectives Pre-session Assess Date:  2/18/22rs    Instr. Date Reinforce Date Post- session Eval Comments   Diabetes disease process & Treatment process: Define diabetes & pre-diabetes; Identify own type of diabetes; role of the pancreas; signs/symptoms; diagnostic criteria; prevention & treatment options; contributing factors. 1 3/1/22rs    2/18/22rs - family hx of dm - but not very heavy - keep at lower wt and unsure why got DM now  Found out about dm in Jan 2022    Express low health literacy - prefers discussion rather than reading   Incorporating nutritional management into lifestyle: Describe effect of type, amount & timing of food on blood glucose;  Describe basic meal planning techniques & current nutrition guideline   1  Having about 2 meals per day    VOA- house now on [employer]     BK- cereals or muffins - some times eggs-     Estefani - sometimes skipped      DN- some meals more carbs - will keep smaller portions - just found out corn was Cho and this was upsetting    Now working on give up junk food   8;30 - 10pm will only one portion of like peanut butter and jelly   Stay away from juice  - drinks mostly water         What to eat - Food groups, When to eat - timing of meals and snacks, and How much to eat - portions control. calories/ day   CHO choices/ meal   CHO choices/  day   grams of protein /day   gram of fat /day     Correctly read food labels & demonstrate CHO counting & portion control with personalized meal plan. Identify dining out strategies, & dietary sick day guidelines. 1       Incorporating physical activity into lifestyle:   Verbalize effect of exercise on blood glucose levels; benefits of regular exercise; safety considerations; contraindications; maintenance of activity. 1 3/1/22rs    Prior was very active when he working - now less active -  At [employer] and working on getting class done   Using medications safely:  Identify effects of diabetes medicines on blood glucose levels; List diabetes medication taken, action & side effects;    1    2/18/22rs Metformin and  Joaquin Chayito  started in Jan and taking daily - get meds from desk from at [facility]   Insulin / Injectable - Appropriate injection sites; proper storage; supplies needed; proper technique; safe needle disposal guidelines. 1    Add insulin taking both lantus 20 units nightly and Humalog at 6 units with meals - offered clarification on when to take the insulin    Monitoring blood glucose, interpreting and using results:  Identify recommended & personal blood glucose targets; importance of testing; testing supplies; HgbA1C target levels; Factors affecting blood glucose;  Importance of logging blood glucose levels for pattern recognition; ketone testing; safe lancet disposal.   1 3/1/22rs    Keeping BG log for PCP   Prevention, detection & treatment of acute complications:  Identify symptoms of hyper & hypoglycemia, and prevention & treatment strategies. 1 3/1/22rs    2/18/22rs - No - not often under 70 - lowest on record was 2/1 he was to 66 - - then he did drink juice    Describe sick day guidelines & indications for  physician notification. Identify short term consequences of poor control. Disaster preparedness strategies    1       Prevention, detection & treatment of chronic complications:  Define the natural course of diabetes & describe the relationship of blood glucose levels to long term complications of diabetes. Identify preventative measures & standards of care. 1    - needs to have foot exam - podiatry - stated he is often out and about   - needs to have eye exam - [facility] appt for this week   - needs full labs / cholesterol check   Developing strategies to address psychosocial issues:  Describe feelings about living with diabetes; Describe how stress, depression & anxiety affect blood glucose; Identify coping strategies; Identify support needed & support network available. 1 3/1/22rs    Paid 8 - not had mental health support currently  - in [place], prior he stated he had some help - willing to talk to some one - expressed he has short temper    Developing strategies to promote health/change behavior: Identify 7 self-care behaviors; Personal health risk factors; Benefits, challenges & strategies for behavioral change;    1 3/1/22rs         Individualized goal selection. My goal , to help me improve my health, I will:   1. Healthy eating - try to eat CHO in meals - limited amount of corn and potatoes       2. Learn more about medication, how and when to take and how the medications work         Plan  Follow-up Appointments planned face to face     Instruction Method: [x]Lecture/Discussion  []Power Point Presentation  [x]Handouts  []Return Demonstration    Education Materials/Equipment Provided (VIA Mail for phone visits)  :    [x]Self-Management - Initial assessment - Enrolment in to ADA  Where do I Begin, Living with Type 2 diabetes ADA home support program and  handout on diabetes education classes. -- 2/18/22rs     o [x]Self-Management  Class 1 -Self-Management  Class 1 - \"How to Thrive: A guide for Your Journey with Diabetes\" ADA booklet 2020 -pages 4, 11- 15 , 18 -23 --3/1/22rs   o    o  You tube and website resource sheet-Understanding Type 2 Diabetes from Animated Diabetes Patient [email or web address]      [] Self-Management  Class 2 - Meal Plan and handout for serving sizes, smarter snacking, Ready Set Carb Counting / Plate Method, Nutrient Conversion and International Diabetes [address] Eating for People with Diabetes and Nutrition in the [employer] - fast facts about fast food and \"How to Thrive: A guide for Your journey with Diabetes\" - ADA booklet 2020  - pages 12 -17    [] Self-Management  Class 3 -   \"How to Thrive: A guide for Your Journey with Diabetes\"  pages 6- 9 &  21 - 28,  type 2 diabetes and the role of GLP- 1,  Individualized Diabetes report card     [] Self-Management Class 4 - BD Booklet  Sick Day Rules and  Dinning Out Guide , recipe hand outs and tips, diabetes Cookbooks  ( when available), & \"How to Thrive: A guide for Your journey with Diabetes\" - ADA booklet 2020  - pages 36 -39     []Self-Management - 3 month follow -  AADE7 Self care behaviors work sheets,  Online resource list - March 2020 ,  How to Thrive: A guide for Your journey with Diabetes\" - ADA booklet 2020  - pages 39. []Self-Management  Gestational - RN class -Resource materials sent out : care booklet - \" Gestational Diabetes Mellitus ( GDM) toolkit form [place] gestational diabetes postpartum care learning collaborative 2018. \"Simple Guidelines for meal planning with gestational diabetes. SMBG sheets to fax back to MFM weekly. BD  healthy injection site selection and rotation with 6 mm insulin syringe and 4 mm pen needle. Gestational diabetes handout from [facility]VIN 2016.  Did you have gestational diabetes when you were pregnant? Handout from [facility]  April 2014    []Self-Management Gestational - RD class - My Food Plan for Gestational diabetes    []Glucose Meter     []Insulin Kit     []Other      Encounter Type Date Start Time End Time Comments No Show Dates   Assessment 2/18/22rs     [x]In Person  []Telephone    Class 1 - Understanding diabetes 3/1/22 3 ;00pm 400 pm  []TelephoneAmerican Diabetes Association  www. diabetes. org    Class 2- Nutrition and diabetes      []Telephone  Healthy Eating with Diabetes- Automatic Data of Diabetes and Digestive and Kidney   https://[email or web address] be/[email or web address]    Class 3 - Preventing Complications     []Telephone    Class 4 -  In depth Nutrition and sick day care    []Telephone  Diabetes Food hub  www. diabetesfoXinhua Travel.org     Class 5 - 3 month follow up / goal reassessment        Gestational - RN         Gestational - RD        Individual MNT         Shared Med Appt         Yearly Follow-up        Meter Instrx      How to Measure Your Blood Sugar - [facility] Patient Education  [email or web address] be/[email or web address]    Insulin Instrx      []Pen  []Vial & Syringe   BD Diabetes Care: How to Inject Insulin with a Pen Needle  [email or web address]    Diabetes Care: How to Inject Insulin with a Syringe  [email or web address] [email or web address]       DSMS Support :   [] MNT      [] Annual update     [] Starting Fresh  adults living with diabetes or pre diabetes. [address] [place] [address] [phone] call for dates    []  Diabetes Group at  [address] mercer - Free 6 week diabetes education support   classes - use web site interest form found at  Bearch.pt - to enroll       []ADA  Where do I Begin, Living with Type 2 diabetes ADA home support program  Web site: diabetes. org/living    Call: 1800 DIABETES  e-mail: [email or web address]     []  Internet web sites - ADAWeb site: diabetes. org and diabetesfoodhub.org      Post Education Referrals:      [] [address] information sheet and [address] , 21       [] Dental care - [facility]     [] [address] link  phone number - for information and referral to [facility]  Clinically  [address] [facility], WEIGHT MANAGEMENT        []Other  Yuli Caruso RN CDE

## 2022-03-02 DIAGNOSIS — E11.00 TYPE 2 DIABETES MELLITUS WITH HYPEROSMOLARITY WITHOUT COMA, WITH LONG-TERM CURRENT USE OF INSULIN (HCC): ICD-10-CM

## 2022-03-02 DIAGNOSIS — Z79.4 TYPE 2 DIABETES MELLITUS WITH HYPEROSMOLARITY WITHOUT COMA, WITH LONG-TERM CURRENT USE OF INSULIN (HCC): ICD-10-CM

## 2022-03-02 NOTE — TELEPHONE ENCOUNTER
Health Maintenance   Topic Date Due    Potassium monitoring  Never done    Creatinine monitoring  Never done    Hepatitis C screen  Never done    Pneumococcal 0-64 years Vaccine (1 of 2 - PPSV23) Never done    Diabetic foot exam  Never done    Lipid screen  Never done    HIV screen  Never done    Diabetic microalbuminuria test  Never done    Diabetic retinal exam  Never done    Hepatitis B vaccine (1 of 3 - Risk 3-dose series) Never done    COVID-19 Vaccine (2 - Booster for Isai series) 01/07/2022    Flu vaccine (1) 01/13/2023 (Originally 9/1/2021)    A1C test (Diabetic or Prediabetic)  04/13/2022    Depression Screen  01/13/2023    DTaP/Tdap/Td vaccine (6 - Td or Tdap) 01/13/2032    Hepatitis A vaccine  Aged Out    Hib vaccine  Aged Out    Meningococcal (ACWY) vaccine  Aged Out             (applicable per patient's age: Cancer Screenings, Depression Screening, Fall Risk Screening, Immunizations)    Hemoglobin A1C (%)   Date Value   01/13/2022 13.2      (goal A1C is < 7)   (goal LDL is <100) need 30-50% reduction from baseline     BP Readings from Last 3 Encounters:   02/16/22 113/80   01/13/22 (!) 135/97    (goal /80)      All Future Testing planned in [facility]:  Lab Frequency Next Occurrence   Basic Metabolic Panel Once 34/26/1737   CBC With Auto Differential Once 04/21/2022   RUFUS-65 Autoantibody Once 04/21/2022   Insulin Antibody Once 04/21/2022   Hepatitis C Antibody Once 04/21/2022   Microalbumin, Ur Once 03/18/2022   Lipid, Fasting Once 03/18/2022       Next Visit Date:  Future Appointments   Date Time Provider Ang Sanabria   3/17/2022  3:00 PM Chelo Cuevas, [address], [address] [place] [address]   4/14/2022  8:30 AM Yulissa Rush MD [facility] WALK IN [facility]            There is no problem list on file for this patient.

## 2022-03-03 RX ORDER — INSULIN GLARGINE 100 [IU]/ML
20 INJECTION, SOLUTION SUBCUTANEOUS NIGHTLY
Qty: 5 PEN | Refills: 0 | Status: SHIPPED | OUTPATIENT
Start: 2022-03-03 | End: 2022-06-03

## 2022-03-07 NOTE — TELEPHONE ENCOUNTER
Health Maintenance   Topic Date Due    Potassium monitoring  Never done    Creatinine monitoring  Never done    Hepatitis C screen  Never done    Pneumococcal 0-64 years Vaccine (1 of 2 - PPSV23) Never done    Diabetic foot exam  Never done    Lipid screen  Never done    HIV screen  Never done    Diabetic microalbuminuria test  Never done    Diabetic retinal exam  Never done    Hepatitis B vaccine (1 of 3 - Risk 3-dose series) Never done    COVID-19 Vaccine (2 - Booster for Isai series) 01/07/2022    Flu vaccine (1) 01/13/2023 (Originally 9/1/2021)    A1C test (Diabetic or Prediabetic)  04/13/2022    Depression Screen  01/13/2023    DTaP/Tdap/Td vaccine (6 - Td or Tdap) 01/13/2032    Hepatitis A vaccine  Aged Out    Hib vaccine  Aged Out    Meningococcal (ACWY) vaccine  Aged Out             (applicable per patient's age: Cancer Screenings, Depression Screening, Fall Risk Screening, Immunizations)    Hemoglobin A1C (%)   Date Value   01/13/2022 13.2      (goal A1C is < 7)   (goal LDL is <100) need 30-50% reduction from baseline     BP Readings from Last 3 Encounters:   02/16/22 113/80   01/13/22 (!) 135/97    (goal /80)      All Future Testing planned in [facility]:  Lab Frequency Next Occurrence   Basic Metabolic Panel Once 49/90/8289   CBC With Auto Differential Once 04/21/2022   RUFUS-65 Autoantibody Once 04/21/2022   Insulin Antibody Once 04/21/2022   Hepatitis C Antibody Once 04/21/2022   Microalbumin, Ur Once 03/18/2022   Lipid, Fasting Once 03/18/2022       Next Visit Date:  Future Appointments   Date Time Provider Ang Sanabria   3/17/2022  3:00 PM Delonte Stephens, [address], [address] [place] [address]   4/14/2022  8:30 AM Kalvin Spurling, MD ST  WALK IN [facility]            There is no problem list on file for this patient.

## 2022-03-08 RX ORDER — GLUCOSAM/CHON-MSM1/C/MANG/BOSW 500-416.6
TABLET ORAL
Qty: 100 EACH | Refills: 3 | Status: SHIPPED | OUTPATIENT
Start: 2022-03-08 | End: 2022-11-03 | Stop reason: SDUPTHER

## 2022-03-08 RX ORDER — PEN NEEDLE, DIABETIC 30 GX3/16"
100 NEEDLE, DISPOSABLE MISCELLANEOUS 3 TIMES DAILY
Qty: 100 EACH | Refills: 5 | Status: SHIPPED | OUTPATIENT
Start: 2022-03-08 | End: 2022-11-03 | Stop reason: SDUPTHER

## 2022-03-17 ENCOUNTER — HOSPITAL ENCOUNTER (OUTPATIENT)
Dept: DIABETES SERVICES | Age: 42
Setting detail: THERAPIES SERIES
Discharge: HOME | End: 2022-03-17
Payer: COMMERCIAL

## 2022-03-17 DIAGNOSIS — E11.00 TYPE 2 DIABETES MELLITUS WITH HYPEROSMOLARITY WITHOUT COMA, WITHOUT LONG-TERM CURRENT USE OF INSULIN (HCC): Primary | ICD-10-CM

## 2022-03-17 PROCEDURE — G0108 DIAB MANAGE TRN  PER INDIV: HCPCS

## 2022-03-17 NOTE — PROGRESS NOTES
Diabetes Self- Management Education Program Assessment -   Also see Diabetic Screening  Patient, Arden Conrad,  here for diabetes self-management education  visit/ assessment. Today's visit was in an individual setting. MEDICAL HISTORY:  No past medical history on file. Family History   Family history unknown: Yes     Patient has no known allergies. Immunization History   Administered Date(s) Administered    COVID-19, J&J, PF, 0.5 mL 11/12/2021    DTP 07/11/1980, 09/05/1980, 11/05/1980, 11/30/1981    MMR 04/17/1981    Polio OPV 07/11/1980, 09/05/1980, 11/05/1980, 11/30/1981    Tdap (Boostrix, Adacel) 01/13/2022     Current Medications  Current Outpatient Medications   Medication Sig Dispense Refill    TRUEplus Lancets 33G MISC USE AS DIRECTED TO TEST BLOOD SUGAR TWO TIMES A  each 3    Insulin Pen Needle (PEN NEEDLES) 32G X 4 MM MISC Inject 100 each as directed in the morning, at noon, and at bedtime 100 each 5    insulin glargine (LANTUS SOLOSTAR) 100 UNIT/ML injection pen Inject 20 Units into the skin nightly 5 pen 0    lisinopril (PRINIVIL;ZESTRIL) 5 MG tablet Take 1 tablet by mouth daily 90 tablet 1    metFORMIN (GLUCOPHAGE) 500 MG tablet Take 1 tablet by mouth daily (with breakfast) 90 tablet 5    FARXIGA 10 MG tablet Take 1 tablet by mouth every morning 90 tablet 5    blood glucose monitor strips Test 3 times a day & as needed for symptoms of irregular blood glucose. Dispense sufficient amount for indicated testing frequency plus additional to accommodate PRN testing needs. 100 strip 5    Alcohol Swabs (ALCOHOL PREP) 70 % PADS USE AS DIRECTED TWO TIMES A  each 3    blood glucose monitor kit and supplies Dispense sufficient amount for indicated testing frequency plus additional to accommodate PRN testing needs. Dispense all needed supplies to include: monitor, strips, lancing device, lancets, control solutions, alcohol swabs.  1 kit 0    blood glucose monitor strips Test 3 times a day & as needed for symptoms of irregular blood glucose. Dispense sufficient amount for indicated testing frequency plus additional to accommodate PRN testing needs. 100 strip 1    insulin lispro (HUMALOG) 100 UNIT/ML injection cartridge Inject 6 Units into the skin 3 times daily (before meals) 4 each 5     No current facility-administered medications for this encounter.   :     Comments:  Allergies:  No Known Allergies      A1C blood level - at goal < 7%   Lab Results   Component Value Date    LABA1C 13.2 01/13/2022     No results found for: GLUF, LABMICR, LDLCALC, CREATININE    Blood pressure ( 130/ 80)  Or less  BP Readings from Last 3 Encounters:   02/16/22 113/80   01/13/22 (!) 135/97        Cholesterol ( LDL under  100)   No results found for: [facility], LDLCHOLESTEROL, LDLDIRECT    Diabetes Self- Management Education Record    Participant Name: Carlos Eduardo Suazo  Referring Provider: Nadeem Hernandez MD   Assessment/Evaluation Ratings:  1=Needs Instruction   4=Demonstrates Understanding/Competency  2=Needs Review   NC=Not Covered    3=Comprehends Key Points  N/A=Not Applicable  Topics/Learning Objectives Pre-session Assess Date:  2/18/22rs    Instr. Date Reinforce Date Post- session Eval Comments   Diabetes disease process & Treatment process: Define diabetes & pre-diabetes; Identify own type of diabetes; role of the pancreas; signs/symptoms; diagnostic criteria; prevention & treatment options; contributing factors. 1 3/1/22rs    2/18/22rs - family hx of dm - but not very heavy - keep at lower wt and unsure why got DM now  Found out about dm in Jan 2022    Express low health literacy - prefers discussion rather than reading   Incorporating nutritional management into lifestyle: Describe effect of type, amount & timing of food on blood glucose;  Describe basic meal planning techniques & current nutrition guideline   1  Having about 2 meals per day    VOA- house now on [employer]     BK- cereals or muffins - some times eggs-     Estefani - sometimes skipped      DN- some meals more carbs - will keep smaller portions - just found out corn was Cho and this was upsetting    Now working on give up junk food   8;30 - 10pm will only one portion of like peanut butter and jelly   Stay away from juice  - drinks mostly water      3/17/22 NADJA showed healthy eating video. PT eats br and supper at [facility]. 15, not there for lunch d/t work schedule. May eat packed pb/jelly sandwiches and/or snacks from vending machines at work. Discussed making sure not to go longer than 5 hours without something. Discussed tips for when eating fast foods. Praised progress made with lifestyle changes. Pt seems to be feeling better about eating. Bss are decreasing, majority within target range. What to eat - Food groups, When to eat - timing of meals and snacks, and How much to eat - portions control. calories/ day   CHO choices/ meal   CHO choices/  day   grams of protein /day   gram of fat /day     Correctly read food labels & demonstrate CHO counting & portion control with personalized meal plan. Identify dining out strategies, & dietary sick day guidelines. 1 3/17/22 NADJA   Basic carbs on labels   Incorporating physical activity into lifestyle:   Verbalize effect of exercise on blood glucose levels; benefits of regular exercise; safety considerations; contraindications; maintenance of activity. 1 3/1/22rs    Prior was very active when he working - now less active -  At [facility]. 15 and working on getting class done   Using medications safely:  Identify effects of diabetes medicines on blood glucose levels; List diabetes medication taken, action & side effects;    1    2/18/22rs Metformin and  Joaquin Chayito  started in Jan and taking daily - get meds from desk from at [facility]   Insulin / Injectable - Appropriate injection sites; proper storage; supplies needed; proper technique; safe needle disposal guidelines.    1 3/17/22 NADJA   Add insulin taking both lantus 20 units nightly and Humalog at 6 units with meals - offered clarification on when to take the insulin    Monitoring blood glucose, interpreting and using results:  Identify recommended & personal blood glucose targets; importance of testing; testing supplies; HgbA1C target levels; Factors affecting blood glucose; Importance of logging blood glucose levels for pattern recognition; ketone testing; safe lancet disposal.   1 3/1/22rs    Keeping BG log for PCP. Encouraged pt to communicate w PCP if having Bs <80 for medication adjustments. Prevention, detection & treatment of acute complications:  Identify symptoms of hyper & hypoglycemia, and prevention & treatment strategies. 1 3/1/22rs    2/18/22rs - No - not often under 70 - lowest on record was 2/1 he was to 79 - - then he did drink juice   3/17/22 JW feeling low at some bs in the 90s, discussed bc of body adjusting after running high numbers. Describe sick day guidelines & indications for  physician notification. Identify short term consequences of poor control. Disaster preparedness strategies    1       Prevention, detection & treatment of chronic complications:  Define the natural course of diabetes & describe the relationship of blood glucose levels to long term complications of diabetes. Identify preventative measures & standards of care. 1    - needs to have foot exam - podiatry - stated he is often out and about   - needs to have eye exam - viry appt for this week   - needs full labs / cholesterol check   Developing strategies to address psychosocial issues:  Describe feelings about living with diabetes; Describe how stress, depression & anxiety affect blood glucose; Identify coping strategies; Identify support needed & support network available.  1 3/1/22rs    Paid 8 - not had mental health support currently  - in [place], prior he stated he had some help - willing to talk to some one - expressed he has short temper    Developing strategies to promote health/change behavior: Identify 7 self-care behaviors; Personal health risk factors; Benefits, challenges & strategies for behavioral change;    1 3/1/22rs         Individualized goal selection. My goal , to help me improve my health, I will:   1. Healthy eating - try to eat CHO in meals - limited amount of corn and potatoes       2. Learn more about medication, how and when to take and how the medications work         Plan  Follow-up Appointments planned face to face     Instruction Method: [x]Lecture/Discussion  []Power Point Presentation  [x]Handouts  []Return Demonstration    Education Materials/Equipment Provided (VIA Mail for phone visits)  :    [x]Self-Management - Initial assessment - Enrolment in to ADA  Where do I Begin, Living with Type 2 diabetes ADA home support program and  handout on diabetes education classes. -- 2/18/22rs     o [x]Self-Management  Class 1 -Self-Management  Class 1 - \"How to Thrive: A guide for Your Journey with Diabetes\" ADA booklet 2020 -pages 4, 11- 15 , 18 -23 --3/1/22rs   o    o  You tube and website resource sheet-Understanding Type 2 Diabetes from Animated Diabetes Patient [email or web address] be/[email or web address]      [x] Self-Management  Class 2 - Meal Plan and handout for serving sizes, smarter snacking, Ready Set Carb Counting / Plate Method, Nutrient Conversion and International Diabetes [address] Eating for People with Diabetes and Nutrition in the [employer] Resources - fast facts about fast food and \"How to Thrive: A guide for Your journey with Diabetes\" - ADA booklet 2020  - pages 12 -173/17/22 JW    [] Self-Management  Class 3 -   \"How to Thrive: A guide for Your Journey with Diabetes\"  pages 6- 9 &  21 - 28,  type 2 diabetes and the role of GLP- 1,  Individualized Diabetes report card     [] Self-Management Class 4 - BD Booklet  Sick Day Rules and  Dinning Out Guide , recipe hand outs and tips, diabetes Cookbooks  ( when available), & \"How to Thrive: A guide for Your journey with Diabetes\" - ADA booklet 2020  - pages 36 -39     []Self-Management - 3 month follow -  AADE7 Self care behaviors work sheets,  Online resource list - March 2020 ,  How to Thrive: A guide for Your journey with Diabetes\" - ADA booklet 2020  - pages 39. []Self-Management  Gestational - RN class -Resource materials sent out : care booklet - \" Gestational Diabetes Mellitus ( GDM) toolkit form [place] gestational diabetes postpartum care learning collaborative 2018. \"Simple Guidelines for meal planning with gestational diabetes. SMBG sheets to fax back to [facility] weekly. BD  healthy injection site selection and rotation with 6 mm insulin syringe and 4 mm pen needle. Gestational diabetes handout from [facility] 2016. Did you have gestational diabetes when you were pregnant? Handout from [facility]  April 2014    []Self-Management Gestational - RD class - My Food Plan for Gestational diabetes    []Glucose Meter     []Insulin Kit     []Other      Encounter Type Date Start Time End Time Comments No Show Dates   Assessment 2/18/22rs     [x]In Person  []Telephone    Class 1 - Understanding diabetes 3/1/22 3 ;00pm 400 pm  []TelephoneAmerican Diabetes Association  www. diabetes. org    Class 2- Nutrition and diabetes   3/17/22 JW 3:00 4:00 []Telephone[x]In Person Healthy Eating with Diabetes- Automatic Data of Diabetes and Digestive and Kidney   [email or web address]://[email or web address] [email or web address]    Class 3 - Preventing Complications     []Telephone    Class 4 -  In depth Nutrition and sick day care    []Telephone  Diabetes Food hub  www. diabetesfoodhub.org     Class 5 - 3 month follow up / goal reassessment        Gestational - RN         Gestational - RD        Individual MNT         Shared Med Appt         Yearly Follow-up        Meter Instrx      How to Measure Your Blood Sugar - [facility] Patient Education  [email or web address] [email or web address]    Insulin Instrx      []Pen  []Vial & Syringe   BD Diabetes Care: How to Inject Insulin with a Pen Needle  [email or web address] [email or web address]    Diabetes Care: How to Inject Insulin with a Syringe  [email or web address] [email or web address]       DSMS Support :   [] MNT      [] Annual update     [] Starting Fresh  adults living with diabetes or pre diabetes. [address] [place] [phone] call for dates    []  Diabetes Group at  [address] - Free 6 week diabetes education support   classes - use web site interest form found at  [employer].pt - to enroll       []ADA  Where do I Begin, Living with Type 2 diabetes ADA home support program  Web site: diabetes. org/living    Call: 1800 DIABETES  e-mail: [email or web address]. org     []  Internet web sites - ADAWeb site: diabetes. org and diabetesfoodhub.org      Post Education Referrals:      [] [facility] information sheet and [address] [facility] , 21       [] Dental care - [facility]     [] [facility] ([facility]) link  phone number - for information and referral to [address], WOUND, WEIGHT MANAGEMENT        []Other  Becca Mcallister RD, LD CDE

## 2022-03-29 ENCOUNTER — HOSPITAL ENCOUNTER (OUTPATIENT)
Age: 42
Discharge: HOME | End: 2022-03-29
Payer: COMMERCIAL

## 2022-03-29 ENCOUNTER — HOSPITAL ENCOUNTER (OUTPATIENT)
Dept: DIABETES SERVICES | Age: 42
Setting detail: THERAPIES SERIES
Discharge: HOME | End: 2022-03-29
Payer: COMMERCIAL

## 2022-03-29 DIAGNOSIS — Z11.59 ENCOUNTER FOR HEPATITIS C SCREENING TEST FOR LOW RISK PATIENT: ICD-10-CM

## 2022-03-29 DIAGNOSIS — E11.00 TYPE 2 DIABETES MELLITUS WITH HYPEROSMOLARITY WITHOUT COMA, WITHOUT LONG-TERM CURRENT USE OF INSULIN (HCC): Primary | ICD-10-CM

## 2022-03-29 DIAGNOSIS — E11.00 TYPE 2 DIABETES MELLITUS WITH HYPEROSMOLARITY WITHOUT COMA, WITH LONG-TERM CURRENT USE OF INSULIN (HCC): ICD-10-CM

## 2022-03-29 DIAGNOSIS — Z79.4 TYPE 2 DIABETES MELLITUS WITH HYPEROSMOLARITY WITHOUT COMA, WITH LONG-TERM CURRENT USE OF INSULIN (HCC): ICD-10-CM

## 2022-03-29 LAB
ABSOLUTE EOS #: 0.36 K/UL (ref 0–0.44)
ABSOLUTE IMMATURE GRANULOCYTE: 0.03 K/UL (ref 0–0.3)
ABSOLUTE LYMPH #: 2.02 K/UL (ref 1.1–3.7)
ABSOLUTE MONO #: 0.78 K/UL (ref 0.1–1.2)
ANION GAP SERPL CALCULATED.3IONS-SCNC: 7 MMOL/L (ref 9–17)
BASOPHILS # BLD: 1 % (ref 0–2)
BASOPHILS ABSOLUTE: 0.08 K/UL (ref 0–0.2)
BUN BLDV-MCNC: 12 MG/DL (ref 6–20)
CALCIUM SERPL-MCNC: 9.1 MG/DL (ref 8.6–10.4)
CHLORIDE BLD-SCNC: 100 MMOL/L (ref 98–107)
CO2: 28 MMOL/L (ref 20–31)
CREAT SERPL-MCNC: 0.82 MG/DL (ref 0.7–1.2)
EOSINOPHILS RELATIVE PERCENT: 4 % (ref 1–4)
GFR AFRICAN AMERICAN: >60 ML/MIN
GFR NON-AFRICAN AMERICAN: >60 ML/MIN
GFR SERPL CREATININE-BSD FRML MDRD: ABNORMAL ML/MIN/{1.73_M2}
GLUCOSE BLD-MCNC: 113 MG/DL (ref 70–99)
HCT VFR BLD CALC: 44.4 % (ref 40.7–50.3)
HEMOGLOBIN: 15 G/DL (ref 13–17)
HEPATITIS C ANTIBODY: NONREACTIVE
IMMATURE GRANULOCYTES: 0 %
LYMPHOCYTES # BLD: 24 % (ref 24–43)
MCH RBC QN AUTO: 30.8 PG (ref 25.2–33.5)
MCHC RBC AUTO-ENTMCNC: 33.8 G/DL (ref 28.4–34.8)
MCV RBC AUTO: 91.2 FL (ref 82.6–102.9)
MONOCYTES # BLD: 9 % (ref 3–12)
NRBC AUTOMATED: 0 PER 100 WBC
PDW BLD-RTO: 13.1 % (ref 11.8–14.4)
PLATELET # BLD: 376 K/UL (ref 138–453)
PMV BLD AUTO: 10.4 FL (ref 8.1–13.5)
POTASSIUM SERPL-SCNC: 4.4 MMOL/L (ref 3.7–5.3)
RBC # BLD: 4.87 M/UL (ref 4.21–5.77)
SEG NEUTROPHILS: 62 % (ref 36–65)
SEGMENTED NEUTROPHILS ABSOLUTE COUNT: 5.25 K/UL (ref 1.5–8.1)
SODIUM BLD-SCNC: 135 MMOL/L (ref 135–144)
WBC # BLD: 8.5 K/UL (ref 3.5–11.3)

## 2022-03-29 PROCEDURE — 80048 BASIC METABOLIC PNL TOTAL CA: CPT

## 2022-03-29 PROCEDURE — G0108 DIAB MANAGE TRN  PER INDIV: HCPCS

## 2022-03-29 PROCEDURE — 82570 ASSAY OF URINE CREATININE: CPT

## 2022-03-29 PROCEDURE — 82043 UR ALBUMIN QUANTITATIVE: CPT

## 2022-03-29 PROCEDURE — 36415 COLL VENOUS BLD VENIPUNCTURE: CPT

## 2022-03-29 PROCEDURE — 86337 INSULIN ANTIBODIES: CPT

## 2022-03-29 PROCEDURE — 85025 COMPLETE CBC W/AUTO DIFF WBC: CPT

## 2022-03-29 PROCEDURE — 86803 HEPATITIS C AB TEST: CPT

## 2022-03-29 PROCEDURE — 86341 ISLET CELL ANTIBODY: CPT

## 2022-03-29 NOTE — PROGRESS NOTES
Diabetes Self- Management Education Program Assessment -   Also see Diabetic Screening  Patient, Ronen Lam,  here for diabetes self-management education  visit/ assessment. Today's visit was in an individual setting. MEDICAL HISTORY:  No past medical history on file. Family History   Family history unknown: Yes     Patient has no known allergies. Immunization History   Administered Date(s) Administered    COVID-19, J&J, PF, 0.5 mL 11/12/2021    DTP 07/11/1980, 09/05/1980, 11/05/1980, 11/30/1981    MMR 04/17/1981    Polio OPV 07/11/1980, 09/05/1980, 11/05/1980, 11/30/1981    Tdap (Boostrix, Adacel) 01/13/2022     Current Medications  Current Outpatient Medications   Medication Sig Dispense Refill    TRUEplus Lancets 33G MISC USE AS DIRECTED TO TEST BLOOD SUGAR TWO TIMES A  each 3    Insulin Pen Needle (PEN NEEDLES) 32G X 4 MM MISC Inject 100 each as directed in the morning, at noon, and at bedtime 100 each 5    insulin glargine (LANTUS SOLOSTAR) 100 UNIT/ML injection pen Inject 20 Units into the skin nightly 5 pen 0    lisinopril (PRINIVIL;ZESTRIL) 5 MG tablet Take 1 tablet by mouth daily 90 tablet 1    metFORMIN (GLUCOPHAGE) 500 MG tablet Take 1 tablet by mouth daily (with breakfast) 90 tablet 5    FARXIGA 10 MG tablet Take 1 tablet by mouth every morning 90 tablet 5    blood glucose monitor strips Test 3 times a day & as needed for symptoms of irregular blood glucose. Dispense sufficient amount for indicated testing frequency plus additional to accommodate PRN testing needs. 100 strip 5    Alcohol Swabs (ALCOHOL PREP) 70 % PADS USE AS DIRECTED TWO TIMES A  each 3    blood glucose monitor kit and supplies Dispense sufficient amount for indicated testing frequency plus additional to accommodate PRN testing needs. Dispense all needed supplies to include: monitor, strips, lancing device, lancets, control solutions, alcohol swabs.  1 kit 0    blood glucose monitor strips Test 3 times a day & as needed for symptoms of irregular blood glucose. Dispense sufficient amount for indicated testing frequency plus additional to accommodate PRN testing needs. 100 strip 1    insulin lispro (HUMALOG) 100 UNIT/ML injection cartridge Inject 6 Units into the skin 3 times daily (before meals) 4 each 5     No current facility-administered medications for this encounter.   :     Comments:  Allergies:  No Known Allergies      A1C blood level - at goal < 7%   Lab Results   Component Value Date    LABA1C 13.2 01/13/2022     No results found for: GLUF, LABMICR, LDLCALC, CREATININE    Blood pressure ( 130/ 80)  Or less  BP Readings from Last 3 Encounters:   02/16/22 113/80   01/13/22 (!) 135/97        Cholesterol ( LDL under  100)   No results found for: [address], LDLCHOLESTEROL, LDLDIRECT    Diabetes Self- Management Education Record    Participant Name: Branden Judd  Referring Provider: Olvin Ham MD   Assessment/Evaluation Ratings:  1=Needs Instruction   4=Demonstrates Understanding/Competency  2=Needs Review   NC=Not Covered    3=Comprehends Key Points  N/A=Not Applicable  Topics/Learning Objectives Pre-session Assess Date:  2/18/22rs    Instr. Date Reinforce Date Post- session Eval Comments   Diabetes disease process & Treatment process: Define diabetes & pre-diabetes; Identify own type of diabetes; role of the pancreas; signs/symptoms; diagnostic criteria; prevention & treatment options; contributing factors. 1 3/1/22rs    2/18/22rs - family hx of dm - but not very heavy - keep at lower wt and unsure why got DM now  Found out about dm in Jan 2022    Express low health literacy - prefers discussion rather than reading   Incorporating nutritional management into lifestyle: Describe effect of type, amount & timing of food on blood glucose;  Describe basic meal planning techniques & current nutrition guideline   1  Having about 2 meals per day    VOA- house now on [place]     BK- cereals or muffins - some times eggs-     Estefani - sometimes skipped      DN- some meals more carbs - will keep smaller portions - just found out corn was Cho and this was upsetting    Now working on give up junk food   8;30 - 10pm will only one portion of like peanut butter and jelly   Stay away from juice  - drinks mostly water      3/17/22 NADJA showed healthy eating video. PT eats br and supper at [facility]. 15, not there for lunch d/t work schedule. May eat packed pb/jelly sandwiches and/or snacks from vending machines at work. Discussed making sure not to go longer than 5 hours without something. Discussed tips for when eating fast foods. Praised progress made with lifestyle changes. Pt seems to be feeling better about eating. Bss are decreasing, majority within target range. What to eat - Food groups, When to eat - timing of meals and snacks, and How much to eat - portions control. calories/ day   CHO choices/ meal   CHO choices/  day   grams of protein /day   gram of fat /day     Correctly read food labels & demonstrate CHO counting & portion control with personalized meal plan. Identify dining out strategies, & dietary sick day guidelines. 1 3/17/22 NADJA   Basic carbs on labels   Incorporating physical activity into lifestyle:   Verbalize effect of exercise on blood glucose levels; benefits of regular exercise; safety considerations; contraindications; maintenance of activity. 1 3/1/22rs    Prior was very active when he working - now less active -  At [facility]. 15 and working on getting class done   Using medications safely:  Identify effects of diabetes medicines on blood glucose levels;  List diabetes medication taken, action & side effects;    1 3/29/22rs 3/29/22rs   2/18/22rs Metformin and  [place]  started in Jan and taking daily - get meds from desk from at [facility]. 15  3/29/22rs - used visuals explanation of how DM meds worked - patient stated most of time takes meds on time, but has had few times where he took nap after work and slept for 4 hours and missed dinner / ate very late - took meds late and then noticed higher BG      Insulin / Injectable - Appropriate injection sites; proper storage; supplies needed; proper technique; safe needle disposal guidelines. 1 3/17/22 JW 3/29/22rs   Add insulin taking both lantus 20 units nightly and Humalog at 6 units with meals - offered clarification on when to take the insulin     3/29/22rs - stated he tries to take insulin with meals and bedtime - stated he is doing better with change of pen tip each time    Monitoring blood glucose, interpreting and using results:  Identify recommended & personal blood glucose targets; importance of testing; testing supplies; HgbA1C target levels; Factors affecting blood glucose; Importance of logging blood glucose levels for pattern recognition; ketone testing; safe lancet disposal.   1 3/1/22rs    Keeping BG log for PCP. Encouraged pt to communicate w PCP if having Bs <80 for medication adjustments. Prevention, detection & treatment of acute complications:  Identify symptoms of hyper & hypoglycemia, and prevention & treatment strategies. 1 3/1/22rs    2/18/22rs - No - not often under 70 - lowest on record was 2/1 he was to 79 - - then he did drink juice   3/17/22 JW feeling low at some bs in the 90s, discussed bc of body adjusting after running high numbers. Describe sick day guidelines & indications for  physician notification. Identify short term consequences of poor control. Disaster preparedness strategies    1       Prevention, detection & treatment of chronic complications:  Define the natural course of diabetes & describe the relationship of blood glucose levels to long term complications of diabetes. Identify preventative measures & standards of care.    1    - needs to have foot exam - podiatry - stated he is often out and about   - needs to have eye exam - [facility] appt for this week   - needs full labs / cholesterol check- 3/29/22- has labs for aida study + routine labs - printed off lab slips and pt did to to lab today - her had appt in 2 weeks with PCP - needs to come back for fasting lipid test      Developing strategies to address psychosocial issues:  Describe feelings about living with diabetes; Describe how stress, depression & anxiety affect blood glucose; Identify coping strategies; Identify support needed & support network available. 1 3/1/22rs    Paid 8 - not had mental health support currently  - in [place], prior he stated he had some help - willing to talk to some one - expressed he has short temper    Developing strategies to promote health/change behavior: Identify 7 self-care behaviors; Personal health risk factors; Benefits, challenges & strategies for behavioral change;    1 3/1/22rs         Individualized goal selection. My goal , to help me improve my health, I will:   1. Healthy eating - try to eat CHO in meals - limited amount of corn and potatoes       2. Learn more about medication, how and when to take and how the medications work         Plan  Follow-up Appointments planned face to face     Instruction Method: [x]Lecture/Discussion  []Power Point Presentation  [x]Handouts  []Return Demonstration    Education Materials/Equipment Provided (VIA Mail for phone visits)  :    [x]Self-Management - Initial assessment - Enrolment in to ADA  Where do I Begin, Living with Type 2 diabetes ADA home support program and  handout on diabetes education classes. -- 2/18/22rs     o [x]Self-Management  Class 1 -Self-Management  Class 1 - \"How to Thrive: A guide for Your Journey with Diabetes\" ADA booklet 2020 -pages 4, 11- 15 , 18 -23 --3/1/22rs   o    o  You tube and website resource sheet-Understanding Type 2 Diabetes from Animated Diabetes Patient https://[email or web address] be/[email or web address]      [x] Self-Management  Class 2 - Meal Plan and handout for serving sizes, smarter snacking, Ready Set Carb Counting / Plate Method, Nutrient Conversion and [address] Eating for People with Diabetes and Nutrition in the [employer] Resources - fast facts about fast food and \"How to Thrive: A guide for Your journey with Diabetes\" - ADA booklet 2020  - pages 12 -173/17/22 JW    [x] Self-Management  Class 3 -   \"How to Thrive: A guide for Your Journey with Diabetes\"  pages 6- 9 &  21 - 28,  type 2 diabetes and the role of GLP- 1,  Individualized Diabetes report card     [] Self-Management Class 4 - BD Booklet  Sick Day Rules and  Dinning Out Guide , recipe hand outs and tips, diabetes Cookbooks  ( when available), & \"How to Thrive: A guide for Your journey with Diabetes\" - ADA booklet 2020  - pages 39 -39     []Self-Management - 3 month follow -  AADE7 Self care behaviors work sheets,  Online resource list - March 2020 ,  How to Thrive: A guide for Your journey with Diabetes\" - ADA booklet 2020  - pages 39. []Self-Management  Gestational - RN class -Resource materials sent out : care booklet - \" Gestational Diabetes Mellitus ( GDM) toolkit form [place] gestational diabetes postpartum care learning collaborative 2018. \"Simple Guidelines for meal planning with gestational diabetes. SMBG sheets to fax back to [facility] weekly. BD  healthy injection site selection and rotation with 6 mm insulin syringe and 4 mm pen needle. Gestational diabetes handout from [facility] 2016. Did you have gestational diabetes when you were pregnant? Handout from [facility]  April 2014    []Self-Management Gestational - RD class - My Food Plan for Gestational diabetes    []Glucose Meter     []Insulin Kit     []Other      Encounter Type Date Start Time End Time Comments No Show Dates   Assessment 2/18/22rs     [x]In Person  []Telephone    Class 1 - Understanding diabetes 3/1/22 3 ;00pm 400 pm  []TelephoneAmerican Diabetes Association  www. diabetes. org    Class 2- Nutrition and diabetes   3/17/22 JW 3:00 4:00 []Telephone[x]In Person Healthy Eating with Diabetes- Automatic Data of Diabetes and Digestive and Kidney   https://[email or web address] be/[email or web address]    Class 3 - Preventing Complications 8/87/31FE   310    3 40  []Telephone    Class 4 -  In depth Nutrition and sick day care    []Telephone  Diabetes Food hub  www. diabetesfoodhub.org     Class 5 - 3 month follow up / goal reassessment        Gestational - RN         Gestational - RD        Individual MNT         Shared Med Appt         Yearly Follow-up        Meter Instrx      How to Measure Your Blood Sugar - [facility] Patient Education  https://youtu. be/[email or web address]    Insulin Instrx      []Pen  []Vial & Syringe   BD Diabetes Care: How to Inject Insulin with a Pen Needle  https://youtu. be/[email or web address]    Diabetes Care: How to Inject Insulin with a Syringe  https://youtu. be/[email or web address]       DSMS Support :   [] MNT      [] Annual update     [] Starting Fresh  adults living with diabetes or pre diabetes. [address] [facility], [address] [place] [address] [phone] call for dates    []  Diabetes Group at  [address] mercer - Free 6 week diabetes education support   classes - use web site interest form found at  [employer] - to enroll       []ADA  Where do I Begin, Living with Type 2 diabetes ADA home support program  Web site: diabetes. org/living    Call: 1800 DIABETES  e-mail: Sully@[employer]. org     []  Internet web sites - ADAWeb site: diabetes. org and diabetesfoodhub.org      Post Education Referrals:      [] [address] information sheet and [address] [facility] , [address]      [] Dental care - [facility]     [] [facility]) link  phone number - for information and referral to [address] [facility]  Clinically  [address], WEIGHT MANAGEMENT        []Other  Laurie Willoughby RN CDE

## 2022-03-30 LAB
CREATININE URINE: 236.6 MG/DL (ref 39–259)
MICROALBUMIN/CREAT 24H UR: <12 MG/L
MICROALBUMIN/CREAT UR-RTO: NORMAL MCG/MG CREAT

## 2022-04-04 LAB
GLUTAMIC ACID DECARB AB: <5 IU/ML (ref 0–5)
HUMAN INSULIN ANTIBODY: 3.1 U/ML (ref 0–0.4)

## 2022-04-13 ENCOUNTER — TELEPHONE (OUTPATIENT)
Dept: PRIMARY CARE CLINIC | Age: 42
End: 2022-04-13

## 2022-04-13 NOTE — TELEPHONE ENCOUNTER
----- Message from Arturo Ravinder sent at 4/13/2022 11:05 AM EDT -----  Subject: Refill Request    QUESTIONS  Name of Medication? Alcohol Swabs (ALCOHOL PREP) 70 % PADS  Patient-reported dosage and instructions? 1 swab 5 x's daily  How many days do you have left? 5  [facility]  Pharmacy phone number (if available)? [phone]  ---------------------------------------------------------------------------  --------------,  Name of Medication? blood glucose monitor strips  Patient-reported dosage and instructions? 1 strip 5X's daily  How many days do you have left? 5  [facility]  Pharmacy phone number (if available)? [phone]  ---------------------------------------------------------------------------  --------------,  Name of Medication? insulin lispro (HUMALOG) 100 UNIT/ML injection   cartridge  Patient-reported dosage and instructions? 6 units 3 x's daily  How many days do you have left? 1  [facility]  Pharmacy phone number (if available)? [phone]  ---------------------------------------------------------------------------  --------------,  Name of Medication? lisinopril (PRINIVIL;ZESTRIL) 5 MG tablet  Patient-reported dosage and instructions? 1 tablet in AM  How many days do you have left? 0  [id number]  Pharmacy phone number (if available)? [phone]  ---------------------------------------------------------------------------  --------------,  Name of Medication? metFORMIN (GLUCOPHAGE) 500 MG tablet  Patient-reported dosage and instructions? 1 tablet 2x's daily  How many days do you have left? 2  [id number]  Pharmacy phone number (if available)? [phone]  ---------------------------------------------------------------------------  --------------,  Name of Medication? FARXIGA 10 MG tablet  Patient-reported dosage and instructions?  1 tablet daily  How many days do you have left? 12  [facility]  Pharmacy phone number (if available)? [phone]  ---------------------------------------------------------------------------  --------------,  Name of Medication? insulin glargine (LANTUS SOLOSTAR) 100 UNIT/ML   injection pen  Patient-reported dosage and instructions? 20 units daily  How many days do you have left? 10  Preferred Pharmacy? [address] [place] [address]  Pharmacy phone number (if available)? [phone]  ---------------------------------------------------------------------------  --------------,  Name of Medication? TRUEplus Lancets 33G MISC  Patient-reported dosage and instructions? 5 lancets daily  How many days do you have left? 8  [facility]  Pharmacy phone number (if available)? [phone]  ---------------------------------------------------------------------------  --------------,  Name of Medication? Insulin Pen Needle (PEN NEEDLES) 32G X 4 MM MISC  Patient-reported dosage and instructions? 4 needles daily  How many days do you have left? 10  [facility]  Pharmacy phone number (if available)? [phone]  ---------------------------------------------------------------------------  --------------  CALL BACK INFO  What is the best way for the office to contact you? OK to leave message on   voicemail  Preferred Call Back Phone Number? [phone]  ---------------------------------------------------------------------------  --------------  SCRIPT ANSWERS  Relationship to Patient?  Self

## 2022-04-13 NOTE — TELEPHONE ENCOUNTER
Left message on voicemail regarding medication.  All medication has refills and can be picked up from pharmacy

## 2022-04-13 NOTE — TELEPHONE ENCOUNTER
----- Message from Mariano Brooke sent at 4/13/2022 11:05 AM EDT -----  Subject: Refill Request    QUESTIONS  Name of Medication? Alcohol Swabs (ALCOHOL PREP) 70 % PADS  Patient-reported dosage and instructions? 1 swab 5 x's daily  How many days do you have left? 5  Preferred Pharmacy? [facility]  Pharmacy phone number (if available)? [phone]  ---------------------------------------------------------------------------  --------------,  Name of Medication? blood glucose monitor strips  Patient-reported dosage and instructions? 1 strip 5X's daily  How many days do you have left? 5  Preferred Pharmacy? [facility] phone number (if available)? [phone]  ---------------------------------------------------------------------------  --------------,  Name of Medication? insulin lispro (HUMALOG) 100 UNIT/ML injection   cartridge  Patient-reported dosage and instructions? 6 units 3 x's daily  How many days do you have left? 1  Preferred Pharmacy? [facility]  Pharmacy phone number (if available)? [phone]  ---------------------------------------------------------------------------  --------------,  Name of Medication? lisinopril (PRINIVIL;ZESTRIL) 5 MG tablet  Patient-reported dosage and instructions? 1 tablet in AM  How many days do you have left? 0  [facility] Pharm[facility]? [id number]  Pharmacy phone number (if available)? [phone]  ---------------------------------------------------------------------------  --------------,  Name of Medication? metFORMIN (GLUCOPHAGE) 500 MG tablet  Patient-reported dosage and instructions? 1 tablet 2x's daily  How many days do you have left? 2  [facility] Pharm[facility]? [id number]  Pharmacy phone number (if available)? [phone]  ---------------------------------------------------------------------------  --------------,  Name of Medication? FARXIGA 10 MG tablet  Patient-reported dosage and instructions?  1 tablet daily  How many days do you have left? 12  [facility]  Pharmacy phone number (if available)? [phone]  ---------------------------------------------------------------------------  --------------,  Name of Medication? insulin glargine (LANTUS SOLOSTAR) 100 UNIT/ML   injection pen  Patient-reported dosage and instructions? 20 units daily  How many days do you have left? 10  [facility] Pharm[facility]  Pharmacy phone number (if available)? [phone]  ---------------------------------------------------------------------------  --------------,  Name of Medication? TRUEplus Lancets 33G MISC  Patient-reported dosage and instructions? 5 lancets daily  How many days do you have left? 8  [facility] Pharm[facility]  Pharmacy phone number (if available)? [phone]  ---------------------------------------------------------------------------  --------------,  Name of Medication? Insulin Pen Needle (PEN NEEDLES) 32G X 4 MM MISC  Patient-reported dosage and instructions? 4 needles daily  How many days do you have left? 10  [facility]  Pharmacy phone number (if available)? [phone]  ---------------------------------------------------------------------------  --------------  CALL BACK INFO  What is the best way for the office to contact you? OK to leave message on   voicemail  Preferred Call Back Phone Number? [phone]  ---------------------------------------------------------------------------  --------------  SCRIPT ANSWERS  Relationship to Patient?  Self

## 2022-04-14 ENCOUNTER — OFFICE VISIT (OUTPATIENT)
Dept: PRIMARY CARE CLINIC | Age: 42
End: 2022-04-14
Payer: COMMERCIAL

## 2022-04-14 VITALS
SYSTOLIC BLOOD PRESSURE: 122 MMHG | WEIGHT: 143 LBS | BODY MASS INDEX: 23.08 KG/M2 | HEART RATE: 109 BPM | TEMPERATURE: 98.1 F | OXYGEN SATURATION: 95 % | DIASTOLIC BLOOD PRESSURE: 77 MMHG

## 2022-04-14 DIAGNOSIS — E11.00 TYPE 2 DIABETES MELLITUS WITH HYPEROSMOLARITY WITHOUT COMA, WITH LONG-TERM CURRENT USE OF INSULIN (HCC): Primary | ICD-10-CM

## 2022-04-14 DIAGNOSIS — Z79.4 TYPE 2 DIABETES MELLITUS WITH HYPEROSMOLARITY WITHOUT COMA, WITH LONG-TERM CURRENT USE OF INSULIN (HCC): Primary | ICD-10-CM

## 2022-04-14 LAB — HBA1C MFR BLD: 6.3 %

## 2022-04-14 PROCEDURE — 1036F TOBACCO NON-USER: CPT | Performed by: STUDENT IN AN ORGANIZED HEALTH CARE EDUCATION/TRAINING PROGRAM

## 2022-04-14 PROCEDURE — G8427 DOCREV CUR MEDS BY ELIG CLIN: HCPCS | Performed by: STUDENT IN AN ORGANIZED HEALTH CARE EDUCATION/TRAINING PROGRAM

## 2022-04-14 PROCEDURE — 99214 OFFICE O/P EST MOD 30 MIN: CPT | Performed by: STUDENT IN AN ORGANIZED HEALTH CARE EDUCATION/TRAINING PROGRAM

## 2022-04-14 PROCEDURE — 3044F HG A1C LEVEL LT 7.0%: CPT | Performed by: STUDENT IN AN ORGANIZED HEALTH CARE EDUCATION/TRAINING PROGRAM

## 2022-04-14 PROCEDURE — G8420 CALC BMI NORM PARAMETERS: HCPCS | Performed by: STUDENT IN AN ORGANIZED HEALTH CARE EDUCATION/TRAINING PROGRAM

## 2022-04-14 PROCEDURE — 83036 HEMOGLOBIN GLYCOSYLATED A1C: CPT | Performed by: STUDENT IN AN ORGANIZED HEALTH CARE EDUCATION/TRAINING PROGRAM

## 2022-04-14 PROCEDURE — 2022F DILAT RTA XM EVC RTNOPTHY: CPT | Performed by: STUDENT IN AN ORGANIZED HEALTH CARE EDUCATION/TRAINING PROGRAM

## 2022-04-14 RX ORDER — ATORVASTATIN CALCIUM 20 MG/1
20 TABLET, FILM COATED ORAL DAILY
Qty: 90 TABLET | Refills: 3 | Status: SHIPPED | OUTPATIENT
Start: 2022-04-14 | End: 2022-11-03 | Stop reason: SDUPTHER

## 2022-04-14 RX ORDER — NICOTINE POLACRILEX 4 MG
15 LOZENGE BUCCAL SEE ADMIN INSTRUCTIONS
Qty: 45 G | Refills: 1 | Status: SHIPPED | OUTPATIENT
Start: 2022-04-14 | End: 2022-11-03 | Stop reason: SDUPTHER

## 2022-04-14 NOTE — PROGRESS NOTES
Visit Information    Have you changed or started any medications since your last visit including any over-the-counter medicines, vitamins, or herbal medicines? no   Are you having any side effects from any of your medications? -  no  Have you stopped taking any of your medications? Is so, why? -  no    Have you seen any other physician or provider since your last visit? No  Have you had any other diagnostic tests since your last visit? No  Have you been seen in the emergency room and/or had an admission to a hospital since we last saw you? No  Have you had your routine dental cleaning in the past 6 months? no    Have you activated your Right [employer] account? If not, what are your barriers?  Yes     Patient Care Team:  Vaishali Suero MD as PCP - General (Internal Medicine)  Vaishali Suero MD as PCP - [facility]    Medical History Review  Past Medical, Family, and Social History reviewed and does contribute to the patient presenting condition    Health Maintenance   Topic Date Due    Pneumococcal 0-64 years Vaccine (1 - PCV) Never done    Diabetic foot exam  Never done    Lipid screen  Never done    HIV screen  Never done    Diabetic retinal exam  Never done    Hepatitis B vaccine (1 of 3 - Risk 3-dose series) Never done    COVID-19 Vaccine (2 - Booster for Cream.HR series) 2022    Flu vaccine (Season Ended) 2023 (Originally 2022)    A1C test (Diabetic or Prediabetic)  2022    Depression Screen  2023    Diabetic microalbuminuria test  2023    Potassium monitoring  2023    Creatinine monitoring  2023    DTaP/Tdap/Td vaccine (6 - Td or Tdap) 2032    Hepatitis C screen  Completed    Hepatitis A vaccine  Aged Out    Hib vaccine  Aged Out    Meningococcal (ACWY) vaccine  Aged Out         Baron Haskins (:  1980) is a 42 y.o. male,Established patient, here for evaluation of the following chief complaint(s):  Follow-up ASSESSMENT/PLAN:  1. Type 2 diabetes mellitus with hyperosmolarity without coma, with long-term current use of insulin ([facility])  -     POCT glycosylated hemoglobin (Hb A1C)  -     glucose (RELION GLUCOSE) 40 % GEL; Take 37.5 mLs by mouth See Admin Instructions, Disp-45 g, R-1Normal    Presents for follow-up last A1c was 13.2 in January  He is currently on basal bolus insulin regimen please see previous notes  Otherwise on appropriate medical care  Lipitor 20 mg needed  Continue same meds  Continue Farxiga and Metformin along with basal bolus insulin    No follow-ups on file. Subjective   SUBJECTIVE/OBJECTIVE:  HPI: 41-year-old male type II diabetic previously uncontrolled now controlled  See previous notes for more in-depth history    Review of Systems Pertinent positives and negatives included in HPI 14 point ROS otherwise negative         Objective   Physical Exam  Vitals and nursing note reviewed. Constitutional:       Appearance: Normal appearance. HENT:      Head: Normocephalic and atraumatic. Eyes:      Extraocular Movements: Extraocular movements intact. Cardiovascular:      Rate and Rhythm: Normal rate and regular rhythm. Pulses: Normal pulses. Heart sounds: Normal heart sounds. Pulmonary:      Effort: Pulmonary effort is normal.      Breath sounds: Normal breath sounds. Abdominal:      General: Abdomen is flat. Palpations: Abdomen is soft. Musculoskeletal:         General: Normal range of motion. Cervical back: Normal range of motion and neck supple. Skin:     General: Skin is warm. Neurological:      General: No focal deficit present. Mental Status: He is alert and oriented to person, place, and time. Wears corrective lenses             An electronic signature was used to authenticate this note.     --Medardo Escamilla MD

## 2022-04-14 NOTE — LETTER
[facility]  [address] [place] [id number]  Phone: [phone]  Fax: [phone]    Palma Cardenas MD        April 14, 2022     Patient: Amilcar Lovelace   YOB: 1980   Date of Visit: 4/14/2022       To Whom it May Concern:    Amilcar Lovelace was seen in my clinic on 4/14/2022. If you have any questions or concerns, please don't hesitate to call.     Sincerely,         Palma Cardenas MD

## 2022-04-19 ENCOUNTER — HOSPITAL ENCOUNTER (OUTPATIENT)
Dept: DIABETES SERVICES | Age: 42
Setting detail: THERAPIES SERIES
Discharge: HOME | End: 2022-04-19
Payer: COMMERCIAL

## 2022-04-19 DIAGNOSIS — E11.00 TYPE 2 DIABETES MELLITUS WITH HYPEROSMOLARITY WITHOUT COMA, WITHOUT LONG-TERM CURRENT USE OF INSULIN (HCC): Primary | ICD-10-CM

## 2022-04-19 PROCEDURE — G0108 DIAB MANAGE TRN  PER INDIV: HCPCS

## 2022-04-19 NOTE — PROGRESS NOTES
Diabetes Self- Management Education Program Assessment -   Also see Diabetic Screening  Patient, Coco Raya,  here for diabetes self-management education  visit/ assessment. Today's visit was in an individual setting. MEDICAL HISTORY:  No past medical history on file. Family History   Family history unknown: Yes     Patient has no known allergies. Immunization History   Administered Date(s) Administered    COVID-19, J&J, PF, 0.5 mL 11/12/2021    DTP 07/11/1980, 09/05/1980, 11/05/1980, 11/30/1981    MMR 04/17/1981    Polio OPV 07/11/1980, 09/05/1980, 11/05/1980, 11/30/1981    Tdap (Boostrix, Adacel) 01/13/2022     Current Medications  Current Outpatient Medications   Medication Sig Dispense Refill    glucose (RELION GLUCOSE) 40 % GEL Take 37.5 mLs by mouth See Admin Instructions 45 g 1    atorvastatin (LIPITOR) 20 MG tablet Take 1 tablet by mouth daily 90 tablet 3    TRUEplus Lancets 33G MISC USE AS DIRECTED TO TEST BLOOD SUGAR TWO TIMES A  each 3    Insulin Pen Needle (PEN NEEDLES) 32G X 4 MM MISC Inject 100 each as directed in the morning, at noon, and at bedtime 100 each 5    insulin glargine (LANTUS SOLOSTAR) 100 UNIT/ML injection pen Inject 20 Units into the skin nightly 5 pen 0    lisinopril (PRINIVIL;ZESTRIL) 5 MG tablet Take 1 tablet by mouth daily 90 tablet 1    metFORMIN (GLUCOPHAGE) 500 MG tablet Take 1 tablet by mouth daily (with breakfast) 90 tablet 5    FARXIGA 10 MG tablet Take 1 tablet by mouth every morning 90 tablet 5    blood glucose monitor strips Test 3 times a day & as needed for symptoms of irregular blood glucose. Dispense sufficient amount for indicated testing frequency plus additional to accommodate PRN testing needs.  100 strip 5    Alcohol Swabs (ALCOHOL PREP) 70 % PADS USE AS DIRECTED TWO TIMES A  each 3    blood glucose monitor kit and supplies Dispense sufficient amount for indicated testing frequency plus additional to accommodate PRN testing needs. Dispense all needed supplies to include: monitor, strips, lancing device, lancets, control solutions, alcohol swabs. 1 kit 0    blood glucose monitor strips Test 3 times a day & as needed for symptoms of irregular blood glucose. Dispense sufficient amount for indicated testing frequency plus additional to accommodate PRN testing needs. 100 strip 1    insulin lispro (HUMALOG) 100 UNIT/ML injection cartridge Inject 6 Units into the skin 3 times daily (before meals) 4 each 5     No current facility-administered medications for this encounter.   :     Comments:  Allergies:  No Known Allergies      A1C blood level - at goal < 7%   Lab Results   Component Value Date    LABA1C 6.3 04/14/2022    LABA1C 13.2 01/13/2022     Lab Results   Component Value Date    LABMICR Can not be calculated 03/29/2022    CREATININE 0.82 03/29/2022       Blood pressure ( 130/ 80)  Or less  BP Readings from Last 3 Encounters:   04/14/22 122/77   02/16/22 113/80   01/13/22 (!) 135/97        Cholesterol ( LDL under  100)   No results found for: [address], LDLCHOLESTEROL, LDLDIRECT    Diabetes Self- Management Education Record    Participant Name: Mansoor Best  Referring Provider: Lupe Sousa MD   Assessment/Evaluation Ratings:  1=Needs Instruction   4=Demonstrates Understanding/Competency  2=Needs Review   NC=Not Covered    3=Comprehends Key Points  N/A=Not Applicable  Topics/Learning Objectives Pre-session Assess Date:  2/18/22rs    Instr. Date Reinforce Date Post- session Eval Comments   Diabetes disease process & Treatment process: Define diabetes & pre-diabetes; Identify own type of diabetes; role of the pancreas; signs/symptoms; diagnostic criteria; prevention & treatment options; contributing factors.  1 3/1/22rs    2/18/22rs - family hx of dm - but not very heavy - keep at lower wt and unsure why got DM now  Found out about dm in Jan 2022    Express low health literacy - prefers discussion rather than reading   Incorporating nutritional management into lifestyle: Describe effect of type, amount & timing of food on blood glucose; Describe basic meal planning techniques & current nutrition guideline   1  Having about 2 meals per day    VOA- house now on [facility]     BK- cereals or muffins - some times eggs-     Estefani - sometimes skipped      DN- some meals more carbs - will keep smaller portions - just found out corn was Cho and this was upsetting    Now working on give up junk food   8;30 - 10pm will only one portion of like peanut butter and jelly   Stay away from juice  - drinks mostly water      3/17/22 NADJA showed healthy eating video. PT eats br and supper at [facility]. 15, not there for lunch d/t work schedule. May eat packed pb/jelly sandwiches and/or snacks from vending machines at work. Discussed making sure not to go longer than 5 hours without something. Discussed tips for when eating fast foods. Praised progress made with lifestyle changes. Pt seems to be feeling better about eating. Bss are decreasing, majority within target range. What to eat - Food groups, When to eat - timing of meals and snacks, and How much to eat - portions control. calories/ day   CHO choices/ meal   CHO choices/  day   grams of protein /day   gram of fat /day     Correctly read food labels & demonstrate CHO counting & portion control with personalized meal plan. Identify dining out strategies, & dietary sick day guidelines. 1 3/17/22 NADJA   Basic carbs on labels   Incorporating physical activity into lifestyle:   Verbalize effect of exercise on blood glucose levels; benefits of regular exercise; safety considerations; contraindications; maintenance of activity. 1 3/1/22rs    Prior was very active when he working - now less active -  At [facility]. 15 and working on getting class done  4/19/22rs - plan to get a new bike - [employer] shared    Using medications safely:  Identify effects of diabetes medicines on blood glucose levels;  List diabetes medication taken, action & side effects;    1 3/29/22rs 3/29/22rs   2/18/22rs Metformin and  [place]  started in Jan and taking daily - get meds from desk from at [facility]  3/29/22rs - used visuals explanation of how DM meds worked - patient stated most of time takes meds on time, but has had few times where he took nap after work and slept for 4 hours and missed dinner / ate very late - took meds late and then noticed higher BG      Insulin / Injectable - Appropriate injection sites; proper storage; supplies needed; proper technique; safe needle disposal guidelines. 1 3/17/22 JW 3/29/22rs   Add insulin taking both lantus 20 units nightly and Humalog at 6 units with meals - offered clarification on when to take the insulin     3/29/22rs - stated he tries to take insulin with meals and bedtime - stated he is doing better with change of pen tip each time    Monitoring blood glucose, interpreting and using results:  Identify recommended & personal blood glucose targets; importance of testing; testing supplies; HgbA1C target levels; Factors affecting blood glucose; Importance of logging blood glucose levels for pattern recognition; ketone testing; safe lancet disposal.   1 3/1/22rs    Keeping BG log for PCP. Encouraged pt to communicate w PCP if having Bs <80 for medication adjustments. 4/19/22rs - BG was 83 in am and then at times BG feels low mid day - in last 2 weeks BG in office today- 199 after lunch       Prevention, detection & treatment of acute complications:  Identify symptoms of hyper & hypoglycemia, and prevention & treatment strategies. 1 3/1/22rs    2/18/22rs - No - not often under 70 - lowest on record was 2/1 he was to 79 - - then he did drink juice   3/17/22 JW feeling low at some bs in the 90s, discussed bc of body adjusting after running high numbers. 4/19/22rs - now A1C 6.5%   Describe sick day guidelines & indications for  physician notification.  Identify short term consequences of poor control. Disaster preparedness strategies    1       Prevention, detection & treatment of chronic complications:  Define the natural course of diabetes & describe the relationship of blood glucose levels to long term complications of diabetes. Identify preventative measures & standards of care. 1 4/19/22rs    - needs to have foot exam - podiatry - stated he is often out and about   - needs to have eye exam - [facility] appt for this week - done  March 2022  - needs full labs / cholesterol check- 3/29/22- has labs for aida study + routine labs - printed off lab slips and pt did to to lab today - her had appt in 2 weeks with PCP - needs to come back for fasting lipid test   - on list - plan to see dentist in may 2022      Developing strategies to address psychosocial issues:  Describe feelings about living with diabetes; Describe how stress, depression & anxiety affect blood glucose; Identify coping strategies; Identify support needed & support network available. 1 3/1/22rs    Paid 8 - not had mental health support currently  - in [place], prior he stated he had some help - willing to talk to some one - expressed he has short temper    Developing strategies to promote health/change behavior: Identify 7 self-care behaviors; Personal health risk factors; Benefits, challenges & strategies for behavioral change;    1 3/1/22rs         Individualized goal selection. My goal , to help me improve my health, I will:   1. Healthy eating - try to eat CHO in meals - limited amount of corn and potatoes       2. Learn more about medication, how and when to take and how the medications work         Plan  Follow-up Appointments planned face to face     Instruction Method: [x]Lecture/Discussion  []Power Point Presentation  [x]Handouts  []Return Demonstration    Education Materials/Equipment Provided (VIA Mail for phone visits)  :    [x]Self-Management - Initial assessment - Enrolment in to [facility]  Where do I Begin, Living with Type 2 diabetes ADA home support program and  handout on diabetes education classes. -- 2/18/22rs     o [x]Self-Management  Class 1 -Self-Management  Class 1 - \"How to Thrive: A guide for Your Journey with Diabetes\" ADA booklet 2020 -pages 4, 11- 15 , 18 -23 --3/1/22rs   o    o  You tube and website resource sheet-Understanding Type 2 Diabetes from Animated Diabetes Patient https://[email or web address] be/[email or web address]      [x] Self-Management  Class 2 - Meal Plan and handout for serving sizes, smarter snacking, Ready Set Carb Counting / Plate Method, Nutrient Conversion and International Diabetes [address] Eating for People with Diabetes and Nutrition in the [employer] Resources - fast facts about fast food and \"How to Thrive: A guide for Your journey with Diabetes\" - ADA booklet 2020  - pages 12 -173/17/22 JW    [x] Self-Management  Class 3 -   \"How to Thrive: A guide for Your Journey with Diabetes\"  pages 6- 9 &  21 - 28,  type 2 diabetes and the role of GLP- 1,  Individualized Diabetes report card -- 4/19/22rs     [] Self-Management Class 4 - BD Booklet  Sick Day [place] and  Dinning Out Guide , recipe hand outs and tips, diabetes Cookbooks  ( when available), & \"How to Thrive: A guide for Your journey with Diabetes\" - ADA booklet 2020  - pages 39 -44     []Self-Management - 3 month follow -  AADE7 Self care behaviors work sheets,  Online resource list - March 2020 ,  How to Thrive: A guide for Your journey with Diabetes\" - ADA booklet 2020  - pages 39. []Self-Management  Gestational - RN class -Resource materials sent out : care booklet - \" Gestational Diabetes Mellitus ( GDM) toolkit form [place] gestational diabetes postpartum care learning collaborative 2018. \"Simple Guidelines for meal planning with gestational diabetes. SMBG sheets to fax back to MFM weekly. BD  healthy injection site selection and rotation with 6 mm insulin syringe and 4 mm pen needle. Gestational diabetes handout from [facility] 2016. Did you have gestational diabetes when you were pregnant? Handout from [facility]  April 2014    []Self-Management Gestational - RD class - My Food Plan for Gestational diabetes    []Glucose Meter     []Insulin Kit     []Other      Encounter Type Date Start Time End Time Comments No Show Dates   Assessment 2/18/22rs     [x]In Person  []Telephone    Class 1 - Understanding diabetes 3/1/22 3 ;00pm 400 pm  []TelephoneAmerican Diabetes Association  www. diabetes. org    Class 2- Nutrition and diabetes   3/17/22 JW 3:00 4:00 []Telephone[x]In Person Healthy Eating with Diabetes- Automatic Data of Diabetes and Digestive and Kidney   https://[email or web address] be/[email or web address]    Class 3 - Preventing Complications 8/54/46PF     4/19/22rs   310      2 45    3 40      3 15 X- in person     X- in person     Class 4 -  In depth Nutrition and sick day care    []Telephone  Diabetes Food hub  www. diabetesfoodiMeigub.org     Class 5 - 3 month follow up / goal reassessment        Gestational - RN         Gestational - RD        Individual MNT         Shared Med Appt         Yearly Follow-up        Meter Instrx      How to Measure Your Blood Sugar - [facility] Patient Education  https://[email or web address] be/[email or web address]    Insulin Instrx      []Pen  []Vial & Syringe   BD Diabetes Care: How to Inject Insulin with a Pen Needle  [email or web address]    Diabetes Care: How to Inject Insulin with a Syringe  [email or web address] [email or web address]       DSMS Support :   [] MNT      [] Annual update     [] Starting Fresh  adults living with diabetes or pre diabetes.  [address] [place] [address] [phone] call for dates    []  Diabetes Group at  [place] [address] of [place] - Free 6 week diabetes education support   classes - use web site interest form found at  Tbricks.pt - to enroll       []ADA  Where do I Begin, Living with Type 2 diabetes ADA home support program  Web site: diabetes. org/living    Call: 1800 DIABETES  e-mail: [email or web address] org     []  Internet web sites - ADAWeb site: diabetes. org and diabetesfoodhub.org      Post Education Referrals:      [] [address] information sheet and [address] [facility] , 21       [] Dental care - Dental care of [facility]     [] [facility] link  phone number - for information and referral to [facility]  Clinically  [address] [facility], WEIGHT MANAGEMENT        []Other  Tang Luciano, FREDERICK CDE

## 2022-04-20 ENCOUNTER — HOSPITAL ENCOUNTER (OUTPATIENT)
Dept: DIABETES SERVICES | Age: 42
Setting detail: THERAPIES SERIES
Discharge: HOME | End: 2022-04-20
Payer: COMMERCIAL

## 2022-04-20 DIAGNOSIS — E11.00 TYPE 2 DIABETES MELLITUS WITH HYPEROSMOLARITY WITHOUT COMA, WITHOUT LONG-TERM CURRENT USE OF INSULIN (HCC): Primary | ICD-10-CM

## 2022-04-20 PROCEDURE — G0108 DIAB MANAGE TRN  PER INDIV: HCPCS

## 2022-04-20 NOTE — TELEPHONE ENCOUNTER
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 PT lives at [address] and his supervisor is Lary Cristina Yoo - [phone]. PT states that the [facility] is not giving him insulin or medication and they are not checking his blood sugar level.

## 2022-04-22 NOTE — TELEPHONE ENCOUNTER
Yes please have someone from office call and confirm that HE NEEDS HIS BLOOD SUGAR CHECK BECAUSE HE IS A DIABETIC. You can deliver the message with much emphasis if you like!

## 2022-04-24 NOTE — PROGRESS NOTES
Diabetes Self- Management Education Program Assessment -   Also see Diabetic Screening  Patient, Manish Russ,  here for diabetes self-management education  visit/ assessment. Today's visit was in an individual setting. MEDICAL HISTORY:  No past medical history on file. Family History   Family history unknown: Yes     Patient has no known allergies. Immunization History   Administered Date(s) Administered    COVID-19, J&J, PF, 0.5 mL 11/12/2021    DTP 07/11/1980, 09/05/1980, 11/05/1980, 11/30/1981    MMR 04/17/1981    Polio OPV 07/11/1980, 09/05/1980, 11/05/1980, 11/30/1981    Tdap (Boostrix, Adacel) 01/13/2022     Current Medications  Current Outpatient Medications   Medication Sig Dispense Refill    glucose (RELION GLUCOSE) 40 % GEL Take 37.5 mLs by mouth See Admin Instructions 45 g 1    atorvastatin (LIPITOR) 20 MG tablet Take 1 tablet by mouth daily 90 tablet 3    TRUEplus Lancets 33G MISC USE AS DIRECTED TO TEST BLOOD SUGAR TWO TIMES A  each 3    Insulin Pen Needle (PEN NEEDLES) 32G X 4 MM MISC Inject 100 each as directed in the morning, at noon, and at bedtime 100 each 5    insulin glargine (LANTUS SOLOSTAR) 100 UNIT/ML injection pen Inject 20 Units into the skin nightly 5 pen 0    lisinopril (PRINIVIL;ZESTRIL) 5 MG tablet Take 1 tablet by mouth daily 90 tablet 1    metFORMIN (GLUCOPHAGE) 500 MG tablet Take 1 tablet by mouth daily (with breakfast) 90 tablet 5    FARXIGA 10 MG tablet Take 1 tablet by mouth every morning 90 tablet 5    blood glucose monitor strips Test 3 times a day & as needed for symptoms of irregular blood glucose. Dispense sufficient amount for indicated testing frequency plus additional to accommodate PRN testing needs.  100 strip 5    Alcohol Swabs (ALCOHOL PREP) 70 % PADS USE AS DIRECTED TWO TIMES A  each 3    blood glucose monitor kit and supplies Dispense sufficient amount for indicated testing frequency plus additional to accommodate PRN testing needs. Dispense all needed supplies to include: monitor, strips, lancing device, lancets, control solutions, alcohol swabs. 1 kit 0    blood glucose monitor strips Test 3 times a day & as needed for symptoms of irregular blood glucose. Dispense sufficient amount for indicated testing frequency plus additional to accommodate PRN testing needs. 100 strip 1    insulin lispro (HUMALOG) 100 UNIT/ML injection cartridge Inject 6 Units into the skin 3 times daily (before meals) 4 each 5     No current facility-administered medications for this encounter.   :     Comments:  Allergies:  No Known Allergies      A1C blood level - at goal < 7%   Lab Results   Component Value Date    LABA1C 6.3 04/14/2022    LABA1C 13.2 01/13/2022     Lab Results   Component Value Date    LABMICR Can not be calculated 03/29/2022    CREATININE 0.82 03/29/2022       Blood pressure ( 130/ 80)  Or less  BP Readings from Last 3 Encounters:   04/14/22 122/77   02/16/22 113/80   01/13/22 (!) 135/97        Cholesterol ( LDL under  100)   No results found for: [address], LDLCHOLESTEROL, LDLDIRECT    Diabetes Self- Management Education Record    Participant Name: Petrona Alicea  Referring Provider: Gurpreet Sutherland MD   Assessment/Evaluation Ratings:  1=Needs Instruction   4=Demonstrates Understanding/Competency  2=Needs Review   NC=Not Covered    3=Comprehends Key Points  N/A=Not Applicable  Topics/Learning Objectives Pre-session Assess Date:  2/18/22rs    Instr. Date Reinforce Date Post- session Eval Comments   Diabetes disease process & Treatment process: Define diabetes & pre-diabetes; Identify own type of diabetes; role of the pancreas; signs/symptoms; diagnostic criteria; prevention & treatment options; contributing factors.  1 3/1/22rs    2/18/22rs - family hx of dm - but not very heavy - keep at lower wt and unsure why got DM now  Found out about dm in Jan 2022    Express low health literacy - prefers discussion rather than reading   Incorporating nutritional management into lifestyle: Describe effect of type, amount & timing of food on blood glucose; Describe basic meal planning techniques & current nutrition guideline   1  Having about 2 meals per day    VOA- house now on [facility]     BK- cereals or muffins - some times eggs-     Estefani - sometimes skipped      DN- some meals more carbs - will keep smaller portions - just found out corn was Cho and this was upsetting    Now working on give up junk food   8;30 - 10pm will only one portion of like peanut butter and jelly   Stay away from juice  - drinks mostly water      3/17/22 JW showed healthy eating video. PT eats br and supper at [facility]. 15, not there for lunch d/t work schedule. May eat packed pb/jelly sandwiches and/or snacks from vending machines at work. Discussed making sure not to go longer than 5 hours without something. Discussed tips for when eating fast foods. Praised progress made with lifestyle changes. Pt seems to be feeling better about eating. Bss are decreasing, majority within target range. 4/20/22 JW came to appt altered eating today and didn't take fast acting insulin as didn't have lancets for checking bs. Pt states they keep them secured at the [facility]. 15. Gave pt box to keep in his backpack. Does keep meds w him and extra food. Doesn't have access to variety of food ([place] and chips with him but only eats if bs ok). Pt didn't feel well when came for appt, gave him a glucerna and snack bar. When he came to [address] [place] [address] office bs 126 and bp within target range. Pt felt better as appt went on. Feels more confident when knows how bs doing. What to eat - Food groups, When to eat - timing of meals and snacks, and How much to eat - portions control. calories/ day   CHO choices/ meal   CHO choices/  day   grams of protein /day   gram of fat /day     Correctly read food labels & demonstrate CHO counting & portion control with personalized meal plan.  Identify dining out strategies, & dietary sick day guidelines. 1 3/17/22    Basic carbs on labels   Incorporating physical activity into lifestyle:   Verbalize effect of exercise on blood glucose levels; benefits of regular exercise; safety considerations; contraindications; maintenance of activity. 1 3/1/22rs    Prior was very active when he working - now less active -  [facility] [employer] and working on getting class done  4/19/22rs - plan to get a new bike - [employer] shared    Using medications safely:  Identify effects of diabetes medicines on blood glucose levels; List diabetes medication taken, action & side effects;    1 3/29/22rs 3/29/22rs   2/18/22rs Metformin and  Reg Gains  started in Jan and taking daily - get meds from desk from at [employer]  3/29/22rs - used visuals explanation of how DM meds worked - patient stated most of time takes meds on time, but has had few times where he took nap after work and slept for 4 hours and missed dinner / ate very late - took meds late and then noticed higher BG      Insulin / Injectable - Appropriate injection sites; proper storage; supplies needed; proper technique; safe needle disposal guidelines. 1 3/17/22 JW 3/29/22rs   Add insulin taking both lantus 20 units nightly and Humalog at 6 units with meals - offered clarification on when to take the insulin     3/29/22rs - stated he tries to take insulin with meals and bedtime - stated he is doing better with change of pen tip each time    Monitoring blood glucose, interpreting and using results:  Identify recommended & personal blood glucose targets; importance of testing; testing supplies; HgbA1C target levels; Factors affecting blood glucose; Importance of logging blood glucose levels for pattern recognition; ketone testing; safe lancet disposal.   1 3/1/22rs    Keeping BG log for PCP. Encouraged pt to communicate w PCP if having Bs <80 for medication adjustments.     4/19/22rs - BG was 83 in am and then at times BG feels low mid day - in last 2 weeks BG in office today- 199 after lunch       Prevention, detection & treatment of acute complications:  Identify symptoms of hyper & hypoglycemia, and prevention & treatment strategies. 1 3/1/22rs    2/18/22rs - No - not often under 70 - lowest on record was 2/1 he was to 79 - - then he did drink juice   3/17/22 JW feeling low at some bs in the 90s, discussed bc of body adjusting after running high numbers. 4/19/22rs - now A1C 6.5%   Describe sick day guidelines & indications for  physician notification. Identify short term consequences of poor control. Disaster preparedness strategies    1 4/20/22 JW      Prevention, detection & treatment of chronic complications:  Define the natural course of diabetes & describe the relationship of blood glucose levels to long term complications of diabetes. Identify preventative measures & standards of care. 1 4/19/22rs    - needs to have foot exam - podiatry - stated he is often out and about   - needs to have eye exam - viry appt for this week - done  March 2022  - needs full labs / cholesterol check- 3/29/22- has labs for aida study + routine labs - printed off lab slips and pt did to to lab today - her had appt in 2 weeks with PCP - needs to come back for fasting lipid test   - on list - plan to see dentist in may 2022      Developing strategies to address psychosocial issues:  Describe feelings about living with diabetes; Describe how stress, depression & anxiety affect blood glucose; Identify coping strategies; Identify support needed & support network available. 1 3/1/22rs    Paid 8 - not had mental health support currently  - in [place], prior he stated he had some help - willing to talk to some one - expressed he has short temper    Developing strategies to promote health/change behavior: Identify 7 self-care behaviors; Personal health risk factors; Benefits, challenges & strategies for behavioral change;    1 3/1/22rs         Individualized goal selection.               My goal , to help me improve my health, I will:   1. Healthy eating - try to eat CHO in meals - limited amount of corn and potatoes       2. Learn more about medication, how and when to take and how the medications work         Plan  Follow-up Appointments planned face to face     Instruction Method: [x]Lecture/Discussion  []Power Point Presentation  [x]Handouts  []Return Demonstration    Education Materials/Equipment Provided (VIA Mail for phone visits)  :    [x]Self-Management - Initial assessment - Enrolment in to ADA  Where do I Begin, Living with Type 2 diabetes ADA home support program and  handout on diabetes education classes. -- 2/18/22rs     o [x]Self-Management  Class 1 -Self-Management  Class 1 - \"How to Thrive: A guide for Your Journey with Diabetes\" ADA booklet 2020 -pages 4, 11- 15 , 18 -23 --3/1/22rs   o    o  You tube and website resource sheet-Understanding Type 2 Diabetes from Animated Diabetes Patient [email or web address]      [x] Self-Management  Class 2 - Meal Plan and handout for serving sizes, smarter snacking, Ready Set Carb Counting / Plate Method, Nutrient Conversion and International Diabetes [address] Eating for People with Diabetes and Nutrition in the [employer] Resources - fast facts about fast food and \"How to Thrive: A guide for Your journey with Diabetes\" - ADA booklet 2020  - pages 12 -173/17/22 JW    [x] Self-Management  Class 3 -   \"How to Thrive: A guide for Your Journey with Diabetes\"  pages 6- 9 &  21 - 28,  type 2 diabetes and the role of GLP- 1,  Individualized Diabetes report card -- 4/19/22rs     [x] Self-Management Class 4 - BD Booklet  Sick Day [place] and  Dinning Out Guide , recipe hand outs and tips, diabetes Cookbooks  ( when available), & \"How to Thrive: A guide for Your journey with Diabetes\" - ADA booklet 2020  - pages 36 -39     []Self-Management - 3 month follow -  AADE7 Self care behaviors work sheets,  Online resource list - March 2020 ,  How to Thrive: A guide for Your journey with Diabetes\" - ADA booklet 2020  - pages 39.4/20/22 JW    []Self-Management  Gestational - RN class -Resource materials sent out : care booklet - \" Gestational Diabetes Mellitus ( GDM) toolkit form [place] gestational diabetes postpartum care learning collaborative 2018. \"Simple Guidelines for meal planning with gestational diabetes. SMBG sheets to fax back to [facility] weekly. BD  healthy injection site selection and rotation with 6 mm insulin syringe and 4 mm pen needle. Gestational diabetes handout from [facility] 2016. Did you have gestational diabetes when you were pregnant? Handout from [facility]  April 2014    []Self-Management Gestational - RD class - My Food Plan for Gestational diabetes    []Glucose Meter     []Insulin Kit     []Other      Encounter Type Date Start Time End Time Comments No Show Dates   Assessment 2/18/22rs     [x]In Person  []Telephone    Class 1 - Understanding diabetes 3/1/22 3 ;00pm 400 pm  []TelephoneAmerican Diabetes Association  www. diabetes. org    Class 2- Nutrition and diabetes   3/17/22 JW 3:00 4:00 []Telephone[x]In Person Healthy Eating with Diabetes- Automatic Data of Diabetes and Digestive and Kidney   https://youtu. be/[email or web address]    Class 3 - Preventing Complications 1/74/02[place]     4/19/22rs   310      2 45    3 40      3 15 X- in person     X- in person     Class 4 -  In depth Nutrition and sick day care 4/20/22 JW 3:00 4:00 []Telephone X- in person   Diabetes Food hub  www. diabetesfoodhub.org     Class 5 - 3 month follow up / goal reassessment        Gestational - RN         Gestational - RD        Individual MNT         Shared Med Appt         Yearly Follow-up        Meter Instrx      How to Measure Your Blood Sugar - [facility] Patient Education  https://[email or web address] be/[email or web address]    Insulin Instrx      []Pen  []Vial & Syringe   BD Diabetes Care: How to Inject Insulin with a Pen Needle  https://[email or web address] [email or web address]    Diabetes Care:  How to Inject Insulin with a Syringe  https://[email or web address] be/[email or web address]       DSMS Support :   [] MNT      [] Annual update     [] Starting Fresh  adults living with diabetes or pre diabetes. [address] [facility] [phone] call for dates    []  Diabetes Group at  [address] mercer - Free 6 week diabetes education support   classes - use web site interest form found at  Biotix.pt - to enroll       []ADA  Where do I Begin, Living with Type 2 diabetes ADA home support program  Web site: diabetes. org/living    Call: 1800 DIABETES  e-mail: Cristian@Personal[employer]. org     []  Internet web sites - ADAWeb site: diabetes. org and diabetesfoodhub.org      Post Education Referrals:      [] [facility] information sheet and [address] [facility] , [facility]       [] Dental care - [facility]     [] [facility] ([facility]) link  phone number - for information and referral to [address], WOUND, WEIGHT MANAGEMENT        []Other  Chloe Forbes, RD, LD CDE

## 2022-04-27 ENCOUNTER — TELEPHONE (OUTPATIENT)
Dept: DIABETES SERVICES | Age: 42
End: 2022-04-27

## 2022-06-03 DIAGNOSIS — Z79.4 TYPE 2 DIABETES MELLITUS WITH HYPEROSMOLARITY WITHOUT COMA, WITH LONG-TERM CURRENT USE OF INSULIN (HCC): ICD-10-CM

## 2022-06-03 DIAGNOSIS — E11.00 TYPE 2 DIABETES MELLITUS WITH HYPEROSMOLARITY WITHOUT COMA, WITH LONG-TERM CURRENT USE OF INSULIN (HCC): ICD-10-CM

## 2022-06-03 RX ORDER — INSULIN GLARGINE 100 [IU]/ML
INJECTION, SOLUTION SUBCUTANEOUS
Qty: 15 ML | Refills: 5 | Status: SHIPPED | OUTPATIENT
Start: 2022-06-03 | End: 2022-11-03 | Stop reason: SDUPTHER

## 2022-06-03 NOTE — TELEPHONE ENCOUNTER
Next Visit Date:  10/12/2022    Hemoglobin A1C (%)   Date Value   04/14/2022 6.3   01/13/2022 13.2             ( goal A1C is < 7)   Microalb/Crt.  Ratio (mcg/mg creat)   Date Value   03/29/2022 Can not be calculated     No results found for: LDLCHOLESTEROL, LDLCALC    (goal LDL is <100)   BUN (mg/dL)   Date Value   03/29/2022 12     BP Readings from Last 3 Encounters:   04/14/22 122/77   02/16/22 113/80   01/13/22 (!) 135/97          (goal 120/80)        There is no problem list on file for this patient.      ----Hannah Arenas

## 2022-07-06 ENCOUNTER — HOSPITAL ENCOUNTER (OUTPATIENT)
Dept: DIABETES SERVICES | Age: 42
Setting detail: THERAPIES SERIES
Discharge: HOME | End: 2022-07-06
Payer: COMMERCIAL

## 2022-07-06 DIAGNOSIS — E11.00 TYPE 2 DIABETES MELLITUS WITH HYPEROSMOLARITY WITHOUT COMA, WITHOUT LONG-TERM CURRENT USE OF INSULIN (HCC): Primary | ICD-10-CM

## 2022-07-06 PROCEDURE — G0108 DIAB MANAGE TRN  PER INDIV: HCPCS

## 2022-07-06 ASSESSMENT — PROBLEM AREAS IN DIABETES QUESTIONNAIRE (PAID)
FEELING THAT DIABETES IS TAKING UP TOO MUCH OF YOUR MENTAL AND PHYSICAL ENERGY EVERY DAY: 0
FEELING SCARED WHEN YOU THINK ABOUT LIVING WITH DIABETES: 1
FEELING DEPRESSED WHEN YOU THINK ABOUT LIVING WITH DIABETES: 0
WORRYING ABOUT THE FUTURE AND THE POSSIBILITY OF SERIOUS COMPLICATIONS: 1
PAID-5 TOTAL SCORE: 2
COPING WITH COMPLICATIONS OF DIABETES: 0

## 2022-07-06 NOTE — PROGRESS NOTES
Diabetes Self- Management Education Program Assessment -   Also see Diabetic Screening  Patient, Felicia Ibarra,  here for diabetes self-management education  visit/ assessment. Today's visit was in an individual setting. MEDICAL HISTORY:  No past medical history on file. Family History   Family history unknown: Yes     Patient has no known allergies. Immunization History   Administered Date(s) Administered    COVID-19, J&J, (age 18y+), IM, 0.5 mL 11/12/2021    DTP 07/11/1980, 09/05/1980, 11/05/1980, 11/30/1981    MMR 04/17/1981    Polio OPV 07/11/1980, 09/05/1980, 11/05/1980, 11/30/1981    Tdap (Boostrix, Adacel) 01/13/2022     Current Medications  Current Outpatient Medications   Medication Sig Dispense Refill    LANTUS SOLOSTAR 100 UNIT/ML injection pen INJECT 20 UNITS INTO THE SKIN ONCE DAILY AT NIGHT. 15 mL 5    glucose (RELION GLUCOSE) 40 % GEL Take 37.5 mLs by mouth See Admin Instructions 45 g 1    atorvastatin (LIPITOR) 20 MG tablet Take 1 tablet by mouth daily 90 tablet 3    TRUEplus Lancets 33G MISC USE AS DIRECTED TO TEST BLOOD SUGAR TWO TIMES A  each 3    Insulin Pen Needle (PEN NEEDLES) 32G X 4 MM MISC Inject 100 each as directed in the morning, at noon, and at bedtime 100 each 5    lisinopril (PRINIVIL;ZESTRIL) 5 MG tablet Take 1 tablet by mouth daily 90 tablet 1    metFORMIN (GLUCOPHAGE) 500 MG tablet Take 1 tablet by mouth daily (with breakfast) 90 tablet 5    FARXIGA 10 MG tablet Take 1 tablet by mouth every morning 90 tablet 5    blood glucose monitor strips Test 3 times a day & as needed for symptoms of irregular blood glucose. Dispense sufficient amount for indicated testing frequency plus additional to accommodate PRN testing needs.  100 strip 5    Alcohol Swabs (ALCOHOL PREP) 70 % PADS USE AS DIRECTED TWO TIMES A  each 3    blood glucose monitor kit and supplies Dispense sufficient amount for indicated testing frequency plus additional to accommodate PRN meal plan. Identify dining out strategies, & dietary sick day guidelines. 1 3/17/22 JW  2 Basic carbs on labels   Incorporating physical activity into lifestyle:   Verbalize effect of exercise on blood glucose levels; benefits of regular exercise; safety considerations; contraindications; maintenance of activity. 1 3/1/22rs   3 Prior was very active when he working - now less active -  At [facility] and working on getting class done  4/19/22rs - plan to get a new bike - [employer] shared   7/6/22use did get new [employer] for transportation to and from work and active at work   Using medications safely:  Identify effects of diabetes medicines on blood glucose levels; List diabetes medication taken, action & side effects;    1 3/29/22rs 3/29/22rs  3 2/18/22rs Metformin and  [place] [employer]  started in Jan and taking daily - get meds from desk from at [facility]  3/29/22rs - used visuals explanation of how DM meds worked - patient stated most of time takes meds on time, but has had few times where he took nap after work and slept for 4 hours and missed dinner / ate very late - took meds late and then noticed higher BG      Insulin / Injectable - Appropriate injection sites; proper storage; supplies needed; proper technique; safe needle disposal guidelines. 1 3/17/22 JW 3/29/22rs  3 Add insulin taking both lantus 20 units nightly and Humalog at 6 units with meals - offered clarification on when to take the insulin     3/29/22rs - stated he tries to take insulin with meals and bedtime - stated he is doing better with change of pen tip each time     7/6/22rs - stated sometime Humalog skipped if BG under 80- lantus 20 units daily at night   Monitoring blood glucose, interpreting and using results:  Identify recommended & personal blood glucose targets; importance of testing; testing supplies; HgbA1C target levels; Factors affecting blood glucose;  Importance of logging blood glucose levels for pattern recognition; ketone testing; safe lancet disposal.   1 3/1/22rs   3 Keeping BG log for PCP. Encouraged pt to communicate w PCP if having Bs <80 for medication adjustments. 4/19/22rs - BG was 83 in am and then at times BG feels low mid day - in last 2 weeks BG in office today- 199 after lunch       Prevention, detection & treatment of acute complications:  Identify symptoms of hyper & hypoglycemia, and prevention & treatment strategies. 1 3/1/22rs   3 2/18/22rs - No - not often under 70 - lowest on record was 2/1 he was to 79 - - then he did drink juice   3/17/22 JW feeling low at some bs in the 90s, discussed bc of body adjusting after running high numbers. 4/19/22rs - now A1C 6.5%   Describe sick day guidelines & indications for  physician notification. Identify short term consequences of poor control. Disaster preparedness strategies    1 4/20/22 JW  3    Prevention, detection & treatment of chronic complications:  Define the natural course of diabetes & describe the relationship of blood glucose levels to long term complications of diabetes. Identify preventative measures & standards of care. 1 4/19/22rs   3 - needs to have foot exam - podiatry - stated he is often out and about   - needs to have eye exam - viry appt for this week - done  March 2022  - needs full labs / cholesterol check- 3/29/22- has labs for aida study + routine labs - printed off lab slips and pt did to to lab today - her had appt in 2 weeks with PCP - needs to come back for fasting lipid test   - on list - plan to see dentist in may 2022   7/6/22rs had foot care appt and no issues and dentist cancelled on pt and he needs to reschedule - no issues eye - had covid booster at health dept, need flu shot     Developing strategies to address psychosocial issues:  Describe feelings about living with diabetes; Describe how stress, depression & anxiety affect blood glucose; Identify coping strategies; Identify support needed & support network available.  1 3/1/22rs   3 Paid 8 - not had mental health support currently  - in [place], prior he stated he had some help - willing to talk to some one - expressed he has short temper   7/6/22- follow up Paid 2    Developing strategies to promote health/change behavior: Identify 7 self-care behaviors; Personal health risk factors; Benefits, challenges & strategies for behavioral change;    1 3/1/22rs   3   7/6/22rs - feel very positive  with life - has own apt    Individualized goal selection. My goal , to help me improve my health, I will:   1. Healthy eating - try to eat CHO in meals - limited amount of corn and potatoes   -7/6/22rs now meeting this goal - now eating meat and whole grain breads in portion control - meeting at least 75%    2. Learn more about medication, how and when to take and how the medications work  7/6/22rs now taking stated sometime Humalog skipped if BG under 80- lantus 20 units daily at night and oral medication daily         Plan  Follow-up Appointments planned face to face  In 3 month    Instruction Method: [x]Lecture/Discussion  []Power Point Presentation  [x]Handouts  []Return Demonstration    Education Materials/Equipment Provided (VIA Mail for phone visits)  :    [x]Self-Management - Initial assessment - Enrolment in to [facility]  Where do I Begin, Living with Type 2 diabetes ADA home support program and  handout on diabetes education classes. -- 2/18/22rs     o [x]Self-Management  Class 1 -Self-Management  Class 1 - \"How to Thrive: A guide for Your Journey with Diabetes\" ADA booklet 2020 -pages 4, 11- 15 , 18 -23 --3/1/22rs   o    o  You tube and website resource sheet-Understanding Type 2 Diabetes from Animated Diabetes Patient https://[email or web address] be/[email or web address]      [x] Self-Management  Class 2 - Meal Plan and handout for serving sizes, smarter snacking, Ready Set Carb Counting / Plate Method, Nutrient Conversion and International Diabetes [address] Eating for People with Diabetes and Nutrition in the [employer] Resources - fast facts about fast food and \"How to Thrive: A guide for Your journey with Diabetes\" - ADA booklet 2020  - pages 12 -173/17/22 JW    [x] Self-Management  Class 3 -   \"How to Thrive: A guide for Your Journey with Diabetes\"  pages 6- 9 &  21 - 28,  type 2 diabetes and the role of GLP- 1,  Individualized Diabetes report card -- 4/19/22rs     [x] Self-Management Class 4 - BD Booklet  Sick Day [place] and  Dinning Out Guide , recipe hand outs and tips, diabetes Cookbooks  ( when available), & \"How to Thrive: A guide for Your journey with Diabetes\" - ADA booklet 2020  - pages 39 -44     [x]Self-Management - 3 month follow -  AADE7 Self care behaviors work sheets,  Online resource list - March 2020 ,  How to Thrive: A guide for Your journey with Diabetes\" - ADA booklet 2020  - pages 39.4/20/22 JW and  7/6/22rs     []Self-Management  Gestational - RN class -Resource materials sent out : care booklet - \" Gestational Diabetes Mellitus ( GDM) toolkit form [place] gestational diabetes postpartum care learning collaborative 2018. \"Simple Guidelines for meal planning with gestational diabetes. SMBG sheets to fax back to [facility] weekly. BD  healthy injection site selection and rotation with 6 mm insulin syringe and 4 mm pen needle. Gestational diabetes handout from [facility] 2016. Did you have gestational diabetes when you were pregnant? Handout from [facility]  April 2014    []Self-Management Gestational - RD class - My Food Plan for Gestational diabetes    []Glucose Meter     []Insulin Kit     []Other      Encounter Type Date Start Time End Time Comments No Show Dates   Assessment 2/18/22rs     [x]In Person  []Telephone    Class 1 - Understanding diabetes 3/1/22 3 ;00pm 400 pm  []TelephoneAmerican Diabetes Association  www. diabetes. org    Class 2- Nutrition and diabetes   3/17/22 JW 3:00 4:00 []Telephone[x]In Person Healthy Eating with Diabetes- Automatic Data of Diabetes and Digestive and Kidney   https://youtu. be/[email or web address]    Class 3 - Preventing Complications 0/40/65IV     4/19/22rs   310      2 45    3 40      3 15 X- in person     X- in person     Class 4 -  In depth Nutrition and sick day care 4/20/22 JW 3:00 4:00 []Telephone X- in person   Diabetes Food hub  www. diabetesfoodhub.org     Class 5 - 3 month follow up / goal reassessment 7/6/22rs   300 4Pm x    Gestational - RN         Gestational - RD        Individual MNT         Shared Med Appt         Yearly Follow-up        Meter Instrx      How to Measure Your Blood Sugar - [facility] Patient Education  https://youtu. be/[email or web address]    Insulin Instrx      []Pen  []Vial & Syringe   BD Diabetes Care: How to Inject Insulin with a Pen Needle  https://youtu. be/[email or web address]    Diabetes Care: How to Inject Insulin with a Syringe  https://youtu. be/9uSSBu-[email or web address]       DSMS Support :   [x] MNT  - appt in aug 2022  [] Annual update     [x] [facility]  adults living with diabetes or pre diabetes. [address] [facility] [phone] call for dates    []  Diabetes Group at  [place] [address] of [place] - Free 6 week diabetes education support   classes - use web site interest form found at  4INFO.pt - to enroll       []ADA  Where do I Begin, Living with Type 2 diabetes ADA home support program  Web site: diabetes. org/living    Call: 1800 DIABETES  e-mail: Jamari@[employer]. org     []  Internet web sites - ADAWeb site: diabetes. org and diabetesfoodhub.org     X-  need of dental care - old [address] [facility] [address] [facility], [facility] 57 [phone] and was given info and plans to call soon for an appt.       Post Education Referrals:      [] [address] [place] [address]  Tobacco Quit information sheet and [address] , 21       [] Dental care - [facility] care of [facility]     [] [facility]) link  phone number - for information and referral to [facility] DAVID SHARMA  Clinically  [address], FOOT, CARDIAC, WOUND, WEIGHT MANAGEMENT        []Other  Juan Manuel Esteban RN CDE

## 2022-07-06 NOTE — LETTER
[facility] Di[facility] ED  [facility] [address]  [facility] [address]  Phone: [phone]         July 6, 2022    To :.Heriberto Ta MD    From: Mayra Spencer RN    Patient: Anival Gutierrez   YOB: 1980   Date of Visit: 7/6/22     The following content has been reviewed since the last assessment: Managing Diabetes (Carb counting, monitoring, medications, physical activity)  Balancing Diabetes (Meal planning, using BG results, identifying BG targets)    Stated he BG are in good control in 80 - 120 range and he working on eating healthy food, he was also referred to [facility]. He is in need of dental care - old [address] [facility] - [email or web address]. [facility], [facility] 57 [phone] and was given info and plans to call soon for an appt. An ongoing plan was created to include:follow up in 3 months with MNT session    Post Program Self Management Support Plans include:  Diabetes Care Appointments with Annette WARREN Diabetes Newsletters/Magazines and Internet Resources    Thank you for the opportunity to provide Diabetes Self Management Education to your patient.

## 2022-09-02 DIAGNOSIS — I10 ESSENTIAL HYPERTENSION: ICD-10-CM

## 2022-09-02 DIAGNOSIS — Z79.4 TYPE 2 DIABETES MELLITUS WITH HYPEROSMOLARITY WITHOUT COMA, WITH LONG-TERM CURRENT USE OF INSULIN (HCC): ICD-10-CM

## 2022-09-02 DIAGNOSIS — E11.00 TYPE 2 DIABETES MELLITUS WITH HYPEROSMOLARITY WITHOUT COMA, WITH LONG-TERM CURRENT USE OF INSULIN (HCC): ICD-10-CM

## 2022-09-02 RX ORDER — UBIQUINOL 100 MG
CAPSULE ORAL
Qty: 100 EACH | Refills: 3 | Status: SHIPPED | OUTPATIENT
Start: 2022-09-02 | End: 2022-11-03 | Stop reason: SDUPTHER

## 2022-09-02 RX ORDER — LISINOPRIL 5 MG/1
TABLET ORAL
Qty: 90 TABLET | Refills: 0 | Status: SHIPPED | OUTPATIENT
Start: 2022-09-02 | End: 2022-11-03 | Stop reason: SDUPTHER

## 2022-09-02 NOTE — TELEPHONE ENCOUNTER
Health Maintenance   Topic Date Due    Pneumococcal 0-64 years Vaccine (1 - PCV) Never done    Diabetic foot exam  Never done    Lipids  Never done    HIV screen  Never done    Diabetic retinal exam  Never done    Hepatitis B vaccine (1 of 3 - Risk 3-dose series) Never done    COVID-19 Vaccine (2 - Booster for Isai series) 01/07/2022    Flu vaccine (1) Never done    Depression Screen  01/13/2023    Diabetic microalbuminuria test  03/29/2023    A1C test (Diabetic or Prediabetic)  04/14/2023    DTaP/Tdap/Td vaccine (6 - Td or Tdap) 01/13/2032    Hepatitis C screen  Completed    Hepatitis A vaccine  Aged Out    Hib vaccine  Aged Out    Meningococcal (ACWY) vaccine  Aged Out             (applicable per patient's age: Cancer Screenings, Depression Screening, Fall Risk Screening, Immunizations)    Hemoglobin A1C (%)   Date Value   04/14/2022 6.3   01/13/2022 13.2     Microalb/Crt. Ratio (mcg/mg creat)   Date Value   03/29/2022 Can not be calculated     BUN (mg/dL)   Date Value   03/29/2022 12      (goal A1C is < 7)   (goal LDL is <100) need 30-50% reduction from baseline     BP Readings from Last 3 Encounters:   04/14/22 122/77   02/16/22 113/80   01/13/22 (!) 135/97    (goal /80)      All Future Testing planned in [facility]:  Lab Frequency Next Occurrence   Microalbumin, Ur Once 06/28/2022   Lipid, Fasting Once 06/28/2022       Next Visit Date:  Future Appointments   Date Time Provider Ang Sanabria   9/6/2022  3:00 PM Joelle Sevilla RD, [address] [facility]   10/12/2022  3:00 PM Gi Ruiz APRN - CNP [facility]            There is no problem list on file for this patient.

## 2022-09-02 NOTE — TELEPHONE ENCOUNTER
Health Maintenance   Topic Date Due    Pneumococcal 0-64 years Vaccine (1 - PCV) Never done    Diabetic foot exam  Never done    Lipids  Never done    HIV screen  Never done    Diabetic retinal exam  Never done    Hepatitis B vaccine (1 of 3 - Risk 3-dose series) Never done    COVID-19 Vaccine (2 - Booster for Isai series) 01/07/2022    Flu vaccine (1) Never done    Depression Screen  01/13/2023    Diabetic microalbuminuria test  03/29/2023    A1C test (Diabetic or Prediabetic)  04/14/2023    DTaP/Tdap/Td vaccine (6 - Td or Tdap) 01/13/2032    Hepatitis C screen  Completed    Hepatitis A vaccine  Aged Out    Hib vaccine  Aged Out    Meningococcal (ACWY) vaccine  Aged Out             (applicable per patient's age: Cancer Screenings, Depression Screening, Fall Risk Screening, Immunizations)    Hemoglobin A1C (%)   Date Value   04/14/2022 6.3   01/13/2022 13.2     Microalb/Crt. Ratio (mcg/mg creat)   Date Value   03/29/2022 Can not be calculated     BUN (mg/dL)   Date Value   03/29/2022 12      (goal A1C is < 7)   (goal LDL is <100) need 30-50% reduction from baseline     BP Readings from Last 3 Encounters:   04/14/22 122/77   02/16/22 113/80   01/13/22 (!) 135/97    (goal /80)      All Future Testing planned in [facility]:  Lab Frequency Next Occurrence   Microalbumin, Ur Once 06/28/2022   Lipid, Fasting Once 06/28/2022       Next Visit Date:  Future Appointments   Date Time Provider Ang Sanabria   9/6/2022  3:00 PM Mehdi Mark RD, [address] [facility]   10/12/2022  3:00 PM Sarita Trevino, APRN - CNP [facility] IN [facility]            There is no problem list on file for this patient.

## 2022-10-20 ENCOUNTER — TELEPHONE (OUTPATIENT)
Dept: PRIMARY CARE CLINIC | Age: 42
End: 2022-10-20

## 2022-10-20 NOTE — TELEPHONE ENCOUNTER
----- Message from [employer] sent at 10/20/2022  1:28 PM EDT -----  Subject: Message to Provider    QUESTIONS  Information for Provider? Pt needs to reschedule appointment with Dr. Jesu Ferraro   to something earlier, please advise.   ---------------------------------------------------------------------------  --------------  [address] [facility]  [phone]; OK to leave message on voicemail  ---------------------------------------------------------------------------  --------------  SCRIPT ANSWERS  Relationship to Patient?  Self

## 2022-10-28 ENCOUNTER — HOSPITAL ENCOUNTER (OUTPATIENT)
Dept: DIABETES SERVICES | Age: 42
Setting detail: THERAPIES SERIES
Discharge: HOME | End: 2022-10-28
Payer: COMMERCIAL

## 2022-10-28 DIAGNOSIS — E11.00 TYPE 2 DIABETES MELLITUS WITH HYPEROSMOLARITY WITHOUT COMA, WITHOUT LONG-TERM CURRENT USE OF INSULIN (HCC): Primary | ICD-10-CM

## 2022-10-28 PROCEDURE — 97802 MEDICAL NUTRITION INDIV IN: CPT

## 2022-10-28 NOTE — PROGRESS NOTES
Diabetes Self- Management Education Program Assessment -   Also see Diabetic Screening  Patient, Isabella Litten,  here for diabetes self-management education  visit/ assessment. Today's visit was in an individual setting. MEDICAL HISTORY:  No past medical history on file. Family History   Family history unknown: Yes     Patient has no known allergies. Immunization History   Administered Date(s) Administered    COVID-19, J&J, (age 18y+), IM, 0.5 mL 11/12/2021    DTP 07/11/1980, 09/05/1980, 11/05/1980, 11/30/1981    MMR 04/17/1981    Polio OPV 07/11/1980, 09/05/1980, 11/05/1980, 11/30/1981    Tdap (Boostrix, Adacel) 01/13/2022     Current Medications  Current Outpatient Medications   Medication Sig Dispense Refill    lisinopril (PRINIVIL;ZESTRIL) 5 MG tablet TAKE 1 TABLET BY MOUTH ONCE DAILY. 90 tablet 0    Alcohol Swabs (ALCOHOL PREP) 70 % PADS USE AS DIRECTED TWICE DAILY. 100 each 3    LANTUS SOLOSTAR 100 UNIT/ML injection pen INJECT 20 UNITS INTO THE SKIN ONCE DAILY AT NIGHT. 15 mL 5    glucose (RELION GLUCOSE) 40 % GEL Take 37.5 mLs by mouth See Admin Instructions 45 g 1    atorvastatin (LIPITOR) 20 MG tablet Take 1 tablet by mouth daily 90 tablet 3    TRUEplus Lancets 33G MISC USE AS DIRECTED TO TEST BLOOD SUGAR TWO TIMES A  each 3    Insulin Pen Needle (PEN NEEDLES) 32G X 4 MM MISC Inject 100 each as directed in the morning, at noon, and at bedtime 100 each 5    metFORMIN (GLUCOPHAGE) 500 MG tablet Take 1 tablet by mouth daily (with breakfast) 90 tablet 5    FARXIGA 10 MG tablet Take 1 tablet by mouth every morning 90 tablet 5    blood glucose monitor strips Test 3 times a day & as needed for symptoms of irregular blood glucose. Dispense sufficient amount for indicated testing frequency plus additional to accommodate PRN testing needs. 100 strip 5    blood glucose monitor kit and supplies Dispense sufficient amount for indicated testing frequency plus additional to accommodate PRN testing needs. Dispense all needed supplies to include: monitor, strips, lancing device, lancets, control solutions, alcohol swabs. 1 kit 0    blood glucose monitor strips Test 3 times a day & as needed for symptoms of irregular blood glucose. Dispense sufficient amount for indicated testing frequency plus additional to accommodate PRN testing needs. 100 strip 1    insulin lispro (HUMALOG) 100 UNIT/ML injection cartridge Inject 6 Units into the skin 3 times daily (before meals) 4 each 5     No current facility-administered medications for this encounter.   :     Comments:  Allergies:  No Known Allergies      A1C blood level - at goal < 7%   Lab Results   Component Value Date    LABA1C 6.3 04/14/2022    LABA1C 13.2 01/13/2022     Lab Results   Component Value Date    LABMICR Can not be calculated 03/29/2022    CREATININE 0.82 03/29/2022       Blood pressure ( 130/ 80)  Or less  BP Readings from Last 3 Encounters:   04/14/22 122/77   02/16/22 113/80   01/13/22 (!) 135/97        Cholesterol ( LDL under  100)   No results found for: [address], LDLCHOLESTEROL, LDLDIRECT    Diabetes Self- Management Education Record    Participant Name: Simon Ashford  Referring Provider: Candido Lance MD   Assessment/Evaluation Ratings:  1=Needs Instruction   4=Demonstrates Understanding/Competency  2=Needs Review   NC=Not Covered    3=Comprehends Key Points  N/A=Not Applicable  Topics/Learning Objectives Pre-session Assess Date:  2/18/22rs    Instr. Date Reinforce Date Post- session Eval  7/6/22rs  Comments   Diabetes disease process & Treatment process: Define diabetes & pre-diabetes; Identify own type of diabetes; role of the pancreas; signs/symptoms; diagnostic criteria; prevention & treatment options; contributing factors.  1 3/1/22rs   2 2/18/22rs - family hx of dm - but not very heavy - keep at lower wt and unsure why got DM now  Found out about dm in Jan 2022    Express low health literacy - prefers discussion rather than reading Incorporating nutritional management into lifestyle: Describe effect of type, amount & timing of food on blood glucose; Describe basic meal planning techniques & current nutrition guideline   1  Having about 2 meals per day    VOA- house now on [facility]     BK- cereals or muffins - some times eggs-     Setefani - sometimes skipped      DN- some meals more carbs - will keep smaller portions - just found out corn was Cho and this was upsetting    Now working on give up junk food   8;30 - 10pm will only one portion of like peanut butter and jelly   Stay away from juice  - drinks mostly water      3/17/22 JW showed healthy eating video. PT eats br and supper at [facility]. 15, not there for lunch d/t work schedule. May eat packed pb/jelly sandwiches and/or snacks from vending machines at work. Discussed making sure not to go longer than 5 hours without something. Discussed tips for when eating fast foods. Praised progress made with lifestyle changes. Pt seems to be feeling better about eating. Bss are decreasing, majority within target range. 4/20/22 JW came to appt altered eating today and didn't take fast acting insulin as didn't have lancets for checking bs. Pt states they keep them secured at the [facility]. 15. Gave pt box to keep in his backpack. Does keep meds w him and extra food. Doesn't have access to variety of food (Spanish and chips with him but only eats if bs ok). Pt didn't feel well when came for appt, gave him a glucerna and snack bar. When he came to [address] [place] [address] office bs 126 and bp within target range. Pt felt better as appt went on. Feels more confident when knows how bs doing. 2 What to eat - Food groups, When to eat - timing of meals and snacks, and How much to eat - portions control. calories/ day   CHO choices/ meal   CHO choices/  day   grams of protein /day   gram of fat /day      Now in his own apt and looking    10/28/22  MNT session. Pt now living on own.  Apt on [address]. 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 Uses very little oil. Trying to eat a variety of foods and meals are regular w Br, Estefani (usually something light) and Michael within 5 hour time spans. Weight stable, reports eating enough, getting enough food. Pt feels diabetes got him on a healthier track. Correctly read food labels & demonstrate CHO counting & portion control with personalized meal plan. Identify dining out strategies, & dietary sick day guidelines. 1 3/17/22 JW 10/28/22 JW  2 Basic carbs on labels   Incorporating physical activity into lifestyle:   Verbalize effect of exercise on blood glucose levels; benefits of regular exercise; safety considerations; contraindications; maintenance of activity. 1 3/1/22rs   3 Prior was very active when he working - now less active -  At [facility] and working on getting class done  4/19/22rs - plan to get a new bike - [employer] shared   7/6/22use did get new Transmetricsbike for transportation to and from work and active at work   Using medications safely:  Identify effects of diabetes medicines on blood glucose levels; List diabetes medication taken, action & side effects;    1 3/29/22rs 3/29/22rs  3 2/18/22rs Metformin and  Laura Alleghany  started in Jan and taking daily - get meds from desk from at [employer]  3/29/22rs - used visuals explanation of how DM meds worked - patient stated most of time takes meds on time, but has had few times where he took nap after work and slept for 4 hours and missed dinner / ate very late - took meds late and then noticed higher BG      Insulin / Injectable - Appropriate injection sites; proper storage; supplies needed; proper technique; safe needle disposal guidelines.    1 3/17/22 JW 3/29/22rs  3 Add insulin taking both lantus 20 units nightly and Humalog at 6 units with meals - offered clarification on when to take the insulin     3/29/22rs - stated he tries to take insulin with meals and bedtime - stated he is doing better with change of pen tip each time     7/6/22rs - stated sometime Humalog skipped if BG under 80- lantus 20 units daily at night   Monitoring blood glucose, interpreting and using results:  Identify recommended & personal blood glucose targets; importance of testing; testing supplies; HgbA1C target levels; Factors affecting blood glucose; Importance of logging blood glucose levels for pattern recognition; ketone testing; safe lancet disposal.   1 3/1/22rs  10/28/22 JW  reports bs within target. Needs repeat A1c. To have PCP appt 11/7/22 3 Keeping BG log for PCP. Encouraged pt to communicate w PCP if having Bs <80 for medication adjustments. 4/19/22rs - BG was 83 in am and then at times BG feels low mid day - in last 2 weeks BG in office today- 199 after lunch       Prevention, detection & treatment of acute complications:  Identify symptoms of hyper & hypoglycemia, and prevention & treatment strategies. 1 3/1/22rs   3 2/18/22rs - No - not often under 70 - lowest on record was 2/1 he was to 79 - - then he did drink juice   3/17/22 JW feeling low at some bs in the 90s, discussed bc of body adjusting after running high numbers. 4/19/22rs - now A1C 6.5%   Describe sick day guidelines & indications for  physician notification. Identify short term consequences of poor control. Disaster preparedness strategies    1 4/20/22 JW  3    Prevention, detection & treatment of chronic complications:  Define the natural course of diabetes & describe the relationship of blood glucose levels to long term complications of diabetes. Identify preventative measures & standards of care.    1 4/19/22rs   3 - needs to have foot exam - podiatry - stated he is often out and about   - needs to have eye exam - [facility] appt for this week - done  March 2022  - needs full labs / cholesterol check- 3/29/22- has labs for aida study + routine labs - printed off lab slips and pt did to to lab today - her had appt in 2 weeks with PCP - needs to come back for fasting lipid test   - on list - plan to see dentist in may 2022   7/6/22rs had foot care appt and no issues and dentist cancelled on pt and he needs to reschedule - no issues eye - had covid booster at health dept, need flu shot     Developing strategies to address psychosocial issues:  Describe feelings about living with diabetes; Describe how stress, depression & anxiety affect blood glucose; Identify coping strategies; Identify support needed & support network available. 1 3/1/22rs  10/28/22 NADJA  has + attitude. States dx of dm has him living healthier lifestyle. Wants to encourage kids to do so 3 Paid 8 - not had mental health support currently  - in [place], prior he stated he had some help - willing to talk to some one - expressed he has short temper   7/6/22- follow up Paid 2    Developing strategies to promote health/change behavior: Identify 7 self-care behaviors; Personal health risk factors; Benefits, challenges & strategies for behavioral change;    1 3/1/22rs   3   7/6/22rs - feel very positive  with life - has own apt    Individualized goal selection. My goal , to help me improve my health, I will:   1. Healthy eating - try to eat CHO in meals - limited amount of corn and potatoes   -7/6/22rs now meeting this goal - now eating meat and whole grain breads in portion control - meeting at least 75%    2. Learn more about medication, how and when to take and how the medications work  7/6/22rs now taking stated sometime Humalog skipped if BG under 80- lantus 20 units daily at night and oral medication daily         Plan  Follow-up Appointments planned face to face  In 3 month    Instruction Method: [x]Lecture/Discussion  []Power Point Presentation  [x]Handouts  []Return Demonstration    Education Materials/Equipment Provided (VIA Mail for phone visits)  :    [x]Self-Management - Initial assessment - Enrolment in to [facility]  Where do I Begin, Living with Type 2 diabetes ADA home support program and  handout on diabetes education classes. -- 2/18/22rs     [x]Self-Management  Class 1 -Self-Management  Class 1 - \"How to Thrive: A guide for Your Journey with Diabetes\" ADA booklet 2020 -pages 4, 11- 15 , 18 -23 --3/1/22rs       You tube and website resource sheet-Understanding Type 2 Diabetes from Animated Diabetes Patient https://[email or web address] be/RNbBt87tEFK      [x] Self-Management  Class 2 - Meal Plan and handout for serving sizes, smarter snacking, Ready Set Carb Counting / Plate Method, Nutrient Conversion and International Diabetes [address] Eating for People with Diabetes and Nutrition in the [employer] Resources - fast facts about fast food and \"How to Thrive: A guide for Your journey with Diabetes\" - ADA booklet 2020  - pages 16 -173/17/22 JW 10/28/22 JW    [x] Self-Management  Class 3 -   \"How to Thrive: A guide for Your Journey with Diabetes\"  pages 6- 9 &  21 - 28,  type 2 diabetes and the role of GLP- 1,  Individualized Diabetes report card -- 4/19/22rs     [x] Self-Management Class 4 - BD Booklet  Sick Day [place] and  [address] , recipe hand outs and tips, diabetes Cookbooks  ( when available), & \"How to Thrive: A guide for Your journey with Diabetes\" - ADA booklet 2020  - pages 39 -39 10/28/22 JW    [x]Self-Management - 3 month follow -  AADE7 Self care behaviors work sheets,  Online resource list - March 2020 ,  How to Thrive: A guide for Your journey with Diabetes\" - ADA booklet 2020  - pages 39.4/20/22 JW and  7/6/22rs     []Self-Management  Gestational - RN class -Resource materials sent out : care booklet - \" Gestational Diabetes Mellitus ( GDM) toolkit form [place] gestational diabetes postpartum care learning collaborative 2018. \"Simple Guidelines for meal planning with gestational diabetes. SMBG sheets to fax back to M weekly. BD  healthy injection site selection and rotation with 6 mm insulin syringe and 4 mm pen needle. Gestational diabetes handout from [facility] 2016.  Did you have gestational diabetes when you were pregnant? Handout from [facility]  April 2014    []Self-Management Gestational - RD class - My Food Plan for Gestational diabetes    []Glucose Meter     []Insulin Kit     []Other      Encounter Type Date Start Time End Time Comments No Show Dates   Assessment 2/18/22rs     [x]In Person  []Telephone    Class 1 - Understanding diabetes 3/1/22 3 ;00pm 400 pm  []TelephoneAmerican Diabetes Association  www. diabetes. org    Class 2- Nutrition and diabetes   3/17/22 JW 3:00 4:00 []Telephone[x]In Person Healthy Eating with Diabetes- Automatic Data of Diabetes and Digestive and Kidney   https://youtu. be/[email or web address]    Class 3 - Preventing Complications 2/10/67IS     4/19/22rs   310      2 45    3 40      3 15 X- in person     X- in person     Class 4 -  In depth Nutrition and sick day care 4/20/22 JW 3:00 4:00 []Telephone X- in person   Diabetes Food hub  www. diabetesfoodEteceb.org     Class 5 - 3 month follow up / goal reassessment 7/6/22rs   300 4Pm x    Gestational - RN         Gestational - RD        Individual MNT 10/28/22 JW 8:40 9:40  X doing well. Living on own    Shared Med Appt         Yearly Follow-up        Meter Instrx      How to Measure Your 64 [address] Jacquelinei Harry Patient Education  https://youtu. be/[email or web address]    Insulin Instrx      []Pen  []Vial & Syringe   BD Diabetes Care: How to Inject Insulin with a Pen Needle  https://youtu. be/[email or web address]    Diabetes Care: How to Inject Insulin with a Syringe  https://youtu. be/9uSSBu-5eSY       DSMS Support :   [x] MNT  - appt in aug 2022  [] Annual update     [x] Starting Fresh  adults living with diabetes or pre diabetes.  [address] [facility] [phone] call for dates    []  Diabetes Group at  [place] [address] of [place] - Free 6 week diabetes education support   classes - use web site interest form found at  Hitwise.pt - to enroll       []ADA  Where do I Begin, Living with Type 2 diabetes ADA home support program  Web site: diabetes. org/living    Call: 1800 DIABETES  e-mail: Marilin@[employer]ric[employer]. org     []  Internet web sites - ADAWeb site: diabetes. org and diabetesfoodhub.org     X-  need of dental care - old [address] [facility] [address] [place], [place] [phone] and was given info and plans to call soon for an appt.       Post Education Referrals:      [] [place] Tobacco Quit information sheet and [address] [facility] , [address]       [] Dental care - [facility] of [facility]     [] [facility]) link  phone number - for information and referral to [address] [facility], WOUND, WEIGHT MANAGEMENT        []Other  Catia Bell RD, LD CDE

## 2022-10-31 ENCOUNTER — HOSPITAL ENCOUNTER (EMERGENCY)
Age: 42
Discharge: HOME | End: 2022-10-31
Attending: EMERGENCY MEDICINE
Payer: COMMERCIAL

## 2022-10-31 VITALS
HEART RATE: 87 BPM | WEIGHT: 142 LBS | BODY MASS INDEX: 22.92 KG/M2 | SYSTOLIC BLOOD PRESSURE: 112 MMHG | DIASTOLIC BLOOD PRESSURE: 75 MMHG | OXYGEN SATURATION: 99 % | TEMPERATURE: 97.3 F | RESPIRATION RATE: 18 BRPM

## 2022-10-31 DIAGNOSIS — J06.9 ACUTE UPPER RESPIRATORY INFECTION: Primary | ICD-10-CM

## 2022-10-31 LAB
FLU A ANTIGEN: NEGATIVE
FLU B ANTIGEN: NEGATIVE
SARS-COV-2, RAPID: NOT DETECTED
SPECIMEN DESCRIPTION: NORMAL

## 2022-10-31 PROCEDURE — 99283 EMERGENCY DEPT VISIT LOW MDM: CPT

## 2022-10-31 PROCEDURE — 87804 INFLUENZA ASSAY W/OPTIC: CPT

## 2022-10-31 PROCEDURE — 87635 SARS-COV-2 COVID-19 AMP PRB: CPT

## 2022-10-31 ASSESSMENT — ENCOUNTER SYMPTOMS
BACK PAIN: 0
ABDOMINAL PAIN: 0
COUGH: 1
CHEST TIGHTNESS: 0
EYE PAIN: 0
VOMITING: 0
EYE REDNESS: 0
NAUSEA: 0
SHORTNESS OF BREATH: 0
FACIAL SWELLING: 0

## 2022-10-31 NOTE — ED PROVIDER NOTES
[facility] [address] [facility]     Emergency Department     Faculty Attestation        I performed a history and physical examination of the patient and discussed management with the resident. I reviewed the residents note and agree with the documented findings and plan of care. Any areas of disagreement are noted on the chart. I was personally present for the key portions of any procedures. I have documented in the chart those procedures where I was not present during the key portions. I have reviewed the emergency nurses triage note. I agree with the chief complaint, past medical history, past surgical history, allergies, medications, social and family history as documented unless otherwise noted below. For mid-level providers such as nurse practitioners as well as physicians assistants:    I have personally seen and evaluated the patient. I find the patient's history and physical exam are consistent with NP/PA documentation. I agree with the care provided, treatment rendered, disposition, & follow-up plan. Additional findings are as noted. Vital Signs: /75   Pulse 87   Temp 97.3 °F (36.3 °C)   Resp 18   Wt 142 lb (64.4 kg)   SpO2 99%   BMI 22.92 kg/m²   PCP:  Juan C Martin MD    Pertinent Comments:     Patient with mild URI symptoms is otherwise afebrile nontoxic.       Critical Care  None          Cruz Little MD    Attending Emergency Medicine Physician            Art Perez MD  10/31/22 1406

## 2022-10-31 NOTE — ED PROVIDER NOTES
[facility] ED  Emergency Department Encounter  Emergency Medicine Resident     Pt Name:Baron Haskins  MRN: [id number]  [place] 1980  Date of evaluation: 10/31/22  PCP:  Anjel Schmidt MD      [address] [place] [address]       Chief Complaint   Patient presents with    Cough    Nasal Congestion       HISTORY OF PRESENT ILLNESS  (Location/Symptom, Timing/Onset, Context/Setting, Quality, Duration, Modifying Factors, Severity.)      Christine Laboy is a 36 y.o. male who presents with nonproductive cough. Patient states that he also has had nasal congestion and subjective fever. He reports the symptoms starting 5 days ago. Patient says he took his temperature on Thursday and it was 97 8. Patient reports taking NyQuil that has helped mildly with his symptoms. He denies any other modifying factors. Patient denies shortness of breath, chest pain, nausea, vomiting, diarrhea or abdominal pain. Patient denies a history of COPD or asthma. Patient denies tobacco use. Patient denies having influenza vaccine. Patient states he did have his vaccine for COVID-19 but has not received a booster. PAST MEDICAL / SURGICAL / SOCIAL / FAMILY HISTORY      has no past medical history on file. Patient denies any pertinent past medical history. has no past surgical history on file. Patient denies any pertinent past surgical history.     Social History     Socioeconomic History    Marital status: Single     Spouse name: Not on file    Number of children: Not on file    Years of education: Not on file    Highest education level: Not on file   Occupational History    Not on file   Tobacco Use    Smoking status: Never    Smokeless tobacco: Never   Vaping Use    Vaping Use: Never used   Substance and Sexual Activity    Alcohol use: Never    Drug use: Never    Sexual activity: Not on file   Other Topics Concern    Not on file   Social History Narrative    Not on file     Social Determinants of Health     Financial Resource Strain: Low Risk     Difficulty of Paying Living Expenses: Not hard at all   Food Insecurity: No Food Insecurity    Worried About Running Out of Food in the Last Year: Never true    Ran Out of Food in the Last Year: Never true   Transportation Needs: Not on file   Physical Activity: Not on file   Stress: Not on file   Social Connections: Not on file   Intimate Partner Violence: Not on file   Housing Stability: Not on file       Family History   Family history unknown: Yes       Allergies:  Patient has no known allergies. Home Medications:  Prior to Admission medications    Medication Sig Start Date End Date Taking? Authorizing Provider   lisinopril (PRINIVIL;ZESTRIL) 5 MG tablet TAKE 1 TABLET BY MOUTH ONCE DAILY. 9/2/22   MEKA Mars CNP   Alcohol Swabs (ALCOHOL PREP) 70 % PADS USE AS DIRECTED TWICE DAILY. 9/2/22   MEKA Mars CNP   LANTUS SOLOSTAR 100 UNIT/ML injection pen INJECT 20 UNITS INTO THE SKIN ONCE DAILY AT NIGHT. 6/3/22   MEKA Mars CNP   glucose (RELION GLUCOSE) 40 % GEL Take 37.5 mLs by mouth See Admin Instructions 4/14/22   Lina Priest MD   atorvastatin (LIPITOR) 20 MG tablet Take 1 tablet by mouth daily 4/14/22   Lina Priest MD   TRUEplus Lancets 33G MISC USE AS DIRECTED TO TEST BLOOD SUGAR TWO TIMES A DAY 3/8/22   Lina Priest MD   Insulin Pen Needle (PEN NEEDLES) 32G X 4 MM MISC Inject 100 each as directed in the morning, at noon, and at bedtime 3/8/22   Lina Priest MD   metFORMIN (GLUCOPHAGE) 500 MG tablet Take 1 tablet by mouth daily (with breakfast) 2/16/22   Lina Priest MD   FARXIGA 10 MG tablet Take 1 tablet by mouth every morning 2/16/22   Lina Priest MD   blood glucose monitor strips Test 3 times a day & as needed for symptoms of irregular blood glucose. Dispense sufficient amount for indicated testing frequency plus additional to accommodate PRN testing needs.  2/16/22   Lina Priest MD   blood glucose monitor kit and supplies Dispense sufficient amount for indicated testing frequency plus additional to accommodate PRN testing needs. Dispense all needed supplies to include: monitor, strips, lancing device, lancets, control solutions, alcohol swabs. 1/14/22   Toney Stevenson MD   blood glucose monitor strips Test 3 times a day & as needed for symptoms of irregular blood glucose. Dispense sufficient amount for indicated testing frequency plus additional to accommodate PRN testing needs. 1/13/22   Lera Mcardle, MD   insulin lispro (HUMALOG) 100 UNIT/ML injection cartridge Inject 6 Units into the skin 3 times daily (before meals) 1/13/22   Lera Mcardle, MD       REVIEW OF SYSTEMS    (2-9 systems for level 4, 10 or more for level 5)      Review of Systems   Constitutional:  Negative for chills, diaphoresis and fatigue. HENT:  Positive for congestion. Negative for dental problem, facial swelling, nosebleeds and tinnitus. Eyes:  Negative for pain, redness and visual disturbance. Respiratory:  Positive for cough. Negative for chest tightness and shortness of breath. Cardiovascular:  Negative for chest pain, palpitations and leg swelling. Gastrointestinal:  Negative for abdominal pain, nausea and vomiting. Genitourinary:  Negative for flank pain, hematuria, penile pain, scrotal swelling and testicular pain. Musculoskeletal:  Negative for back pain, neck pain and neck stiffness. Skin:  Negative for pallor, rash and wound. Neurological:  Negative for syncope, facial asymmetry, weakness, numbness and headaches. PHYSICAL EXAM   (up to 7 for level 4, 8 or more for level 5)      INITIAL VITALS:   /75   Pulse 87   Temp 97.3 °F (36.3 °C)   Resp 18   Wt 142 lb (64.4 kg)   SpO2 99%   BMI 22.92 kg/m²     Physical Exam  Constitutional:       Appearance: Normal appearance. He is not ill-appearing or diaphoretic. HENT:      Head: Normocephalic and atraumatic. Right Ear:  There is impacted cerumen. Left Ear: There is impacted cerumen. Nose: Nose normal.      Mouth/Throat:      Mouth: Mucous membranes are moist.      Pharynx: Oropharynx is clear. Eyes:      Extraocular Movements: Extraocular movements intact. Pupils: Pupils are equal, round, and reactive to light. Cardiovascular:      Rate and Rhythm: Normal rate and regular rhythm. Pulses: Normal pulses. Heart sounds: Normal heart sounds. No murmur heard. No friction rub. No gallop. Pulmonary:      Effort: Pulmonary effort is normal.      Breath sounds: Normal breath sounds. No stridor. No rhonchi or rales. Abdominal:      General: Bowel sounds are normal. There is no distension. Palpations: Abdomen is soft. Tenderness: There is no abdominal tenderness. There is no guarding or rebound. Hernia: No hernia is present. Musculoskeletal:         General: No swelling, tenderness, deformity or signs of injury. Normal range of motion. Cervical back: Normal range of motion and neck supple. No tenderness. Skin:     General: Skin is warm and dry. Neurological:      General: No focal deficit present. Mental Status: He is alert and oriented to person, place, and time. Cranial Nerves: No cranial nerve deficit. Sensory: No sensory deficit. Motor: No weakness. Psychiatric:         Mood and Affect: Mood normal.       DIFFERENTIAL  DIAGNOSIS     PLAN (LABS / IMAGING / EKG):  Orders Placed This Encounter   Procedures    COVID-19, Rapid    RAPID INFLUENZA A/B ANTIGENS         MEDICATIONS ORDERED:  No orders of the defined types were placed in this encounter. DDX:   COVID-19, influenza, viral syndrome        DIAGNOSTIC RESULTS / EMERGENCY DEPARTMENT COURSE / MDM   LAB RESULTS:  Results for orders placed or performed during the hospital encounter of 10/31/22   COVID-19, Rapid    Specimen: Nasopharyngeal Swab   Result Value Ref Range    Specimen Description . NASOPHARYNGEAL SWAB     SARS-CoV-2, Rapid Not Detected Not Detected   RAPID INFLUENZA A/B ANTIGENS    Specimen: Nasopharyngeal   Result Value Ref Range    Flu A Antigen NEGATIVE NEGATIVE    Flu B Antigen NEGATIVE NEGATIVE       IMPRESSION:   Patient is a 58-year-old male coming in for nasal congestion and nonproductive cough. Patient is concerned for possible COVID-19 or influenza. Patient denies having his seasonal influenza vaccine or COVID-19 booster. Will obtain COVID-19 and influenza swabs. COVID and influenza swabs are negative. Will discharge home patient. Patient agreed with discharge home. Patient was given return precautions. RADIOLOGY:  No orders to display         EKG      All EKG's are interpreted by the Emergency Department Physician who either signs or Co-signs this chart in the absence of a cardiologist.    [address] [place] [address]:           No notes of  Admission Criteria type on file. PROCEDURES:      CONSULTS:  None    CRITICAL CARE:      FINAL IMPRESSION      1.  Acute upper respiratory infection          DISPOSITION / PLAN     DISPOSITION Decision To Discharge 10/31/2022 05:34:46 PM      PATIENT REFERRED TO:  Kameron Dockery MD  [address] [place] [address] [address] [place] [address]  [phone]    Schedule an appointment as soon as possible for a visit   As needed    DISCHARGE MEDICATIONS:  New Prescriptions    No medications on file       Jen Mayberry DO  Emergency Medicine Resident    (Please note that portions of thisnote were completed with a voice recognition program.  Efforts were made to edit the dictations but occasionally words are mis-transcribed.)       Jen Mayberry DO  Resident  10/31/22 1122

## 2022-10-31 NOTE — DISCHARGE INSTRUCTIONS
Drink plenty of water or Gatorade type solution. Avoid drinking alcohol or drinks that have caffeine in it. Take your medication as indicated and prescribed. For pain use acetaminophen (Tylenol) or ibuprofen (Motrin / Advil), unless prescribed medications that have acetaminophen or ibuprofen (or similar medications) in it. You can take over the counter acetaminophen tablets (1 - 2 tablets of the 500-mg strength every 6 hours) or ibuprofen tablets (2 tablets every 4 hours). Take over the counter medications for any nasal congestion / sore throat type symptoms. Mix 1 teaspoon of maalox and 1 teaspoon of liquid benadryl, gargle for 1 minute then swallow. You can also use Cepacol lozenge or Chloraseptic throat spray to help soothe your throat. PLEASE RETURN TO THE EMERGENCY DEPARTMENT IMMEDIATELY for worsening symptoms or if you develop any concerning symptoms such as: high fever not relieved by acetaminophen (Tylenol) and/or ibuprofen (Motrin / Advil), chills, shortness of breath, chest pain, feeling of your heart fluttering or racing, persistent nausea and/or vomiting, vomiting up blood, blood in your stool, loss of consciousness, numbness, weakness or tingling in the arms or legs or change in color of the extremities, changes in mental status, persistent headache, blurry vision loss of bladder / bowel control, unable to follow up with your physician, or other any other care or concern.

## 2022-11-03 ENCOUNTER — OFFICE VISIT (OUTPATIENT)
Dept: PRIMARY CARE CLINIC | Age: 42
End: 2022-11-03
Payer: COMMERCIAL

## 2022-11-03 VITALS
BODY MASS INDEX: 22.92 KG/M2 | TEMPERATURE: 97.7 F | DIASTOLIC BLOOD PRESSURE: 79 MMHG | OXYGEN SATURATION: 98 % | HEART RATE: 92 BPM | SYSTOLIC BLOOD PRESSURE: 120 MMHG | WEIGHT: 142 LBS

## 2022-11-03 DIAGNOSIS — Z79.4 TYPE 2 DIABETES MELLITUS WITH HYPEROSMOLARITY WITHOUT COMA, WITH LONG-TERM CURRENT USE OF INSULIN (HCC): Primary | ICD-10-CM

## 2022-11-03 DIAGNOSIS — I10 ESSENTIAL HYPERTENSION: ICD-10-CM

## 2022-11-03 DIAGNOSIS — E11.00 TYPE 2 DIABETES MELLITUS WITH HYPEROSMOLARITY WITHOUT COMA, WITH LONG-TERM CURRENT USE OF INSULIN (HCC): Primary | ICD-10-CM

## 2022-11-03 DIAGNOSIS — J06.9 VIRAL UPPER RESPIRATORY TRACT INFECTION: ICD-10-CM

## 2022-11-03 LAB — HBA1C MFR BLD: 7.6 %

## 2022-11-03 PROCEDURE — 83036 HEMOGLOBIN GLYCOSYLATED A1C: CPT | Performed by: NURSE PRACTITIONER

## 2022-11-03 PROCEDURE — 3051F HG A1C>EQUAL 7.0%<8.0%: CPT | Performed by: NURSE PRACTITIONER

## 2022-11-03 PROCEDURE — 3074F SYST BP LT 130 MM HG: CPT | Performed by: NURSE PRACTITIONER

## 2022-11-03 PROCEDURE — 99213 OFFICE O/P EST LOW 20 MIN: CPT | Performed by: NURSE PRACTITIONER

## 2022-11-03 PROCEDURE — 3078F DIAST BP <80 MM HG: CPT | Performed by: NURSE PRACTITIONER

## 2022-11-03 RX ORDER — DAPAGLIFLOZIN 10 MG/1
10 TABLET, FILM COATED ORAL EVERY MORNING
Qty: 90 TABLET | Refills: 5 | Status: SHIPPED | OUTPATIENT
Start: 2022-11-03

## 2022-11-03 RX ORDER — UBIQUINOL 100 MG
CAPSULE ORAL
Qty: 100 EACH | Refills: 3 | Status: SHIPPED | OUTPATIENT
Start: 2022-11-03

## 2022-11-03 RX ORDER — GLUCOSAM/CHON-MSM1/C/MANG/BOSW 500-416.6
TABLET ORAL
Qty: 100 EACH | Refills: 3 | Status: SHIPPED | OUTPATIENT
Start: 2022-11-03

## 2022-11-03 RX ORDER — PEN NEEDLE, DIABETIC 30 GX3/16"
100 NEEDLE, DISPOSABLE MISCELLANEOUS 3 TIMES DAILY
Qty: 100 EACH | Refills: 5 | Status: SHIPPED | OUTPATIENT
Start: 2022-11-03

## 2022-11-03 RX ORDER — NICOTINE POLACRILEX 4 MG
15 LOZENGE BUCCAL SEE ADMIN INSTRUCTIONS
Qty: 45 G | Refills: 1 | Status: SHIPPED | OUTPATIENT
Start: 2022-11-03

## 2022-11-03 RX ORDER — ATORVASTATIN CALCIUM 20 MG/1
20 TABLET, FILM COATED ORAL DAILY
Qty: 90 TABLET | Refills: 3 | Status: SHIPPED | OUTPATIENT
Start: 2022-11-03

## 2022-11-03 RX ORDER — INSULIN GLARGINE 100 [IU]/ML
INJECTION, SOLUTION SUBCUTANEOUS
Qty: 15 ML | Refills: 5 | Status: SHIPPED | OUTPATIENT
Start: 2022-11-03

## 2022-11-03 RX ORDER — INSULIN LISPRO 100 [IU]/ML
6 INJECTION, SOLUTION INTRAVENOUS; SUBCUTANEOUS
Qty: 4 EACH | Refills: 5 | Status: SHIPPED | OUTPATIENT
Start: 2022-11-03

## 2022-11-03 RX ORDER — GLUCOSAMINE HCL/CHONDROITIN SU 500-400 MG
CAPSULE ORAL
Qty: 100 STRIP | Refills: 1 | Status: SHIPPED | OUTPATIENT
Start: 2022-11-03

## 2022-11-03 RX ORDER — LISINOPRIL 5 MG/1
TABLET ORAL
Qty: 90 TABLET | Refills: 0 | Status: SHIPPED | OUTPATIENT
Start: 2022-11-03

## 2022-11-03 ASSESSMENT — ENCOUNTER SYMPTOMS
EYE DISCHARGE: 0
SINUS PRESSURE: 1
CHEST TIGHTNESS: 0
EYE REDNESS: 0
NAUSEA: 0
VOMITING: 0
SHORTNESS OF BREATH: 0
COUGH: 1

## 2022-11-03 NOTE — PROGRESS NOTES
[facility]  [address] [place] [address]  Dept: [phone]  Dept Fax: [phone]    Rodo Martinez (:  1980) is a 36 y.o. male,{New vs Established:[id number]::\"Established patient\"}, here for evaluation of the following chief complaint(s):  No chief complaint on file. ASSESSMENT/PLAN:  {There are no diagnoses linked to this encounter. (Refresh or delete this SmartLink)}    No follow-ups on file. Subjective   SUBJECTIVE/OBJECTIVE:  HPI    Review of Systems       Objective     DIAGNOSTIC FINDINGS:  CBC:  Lab Results   Component Value Date/Time    WBC 8.5 2022 04:28 PM    HGB 15.0 2022 04:28 PM     2022 04:28 PM       BMP:    Lab Results   Component Value Date/Time     2022 04:28 PM    K 4.4 2022 04:28 PM     2022 04:28 PM    CO2 28 2022 04:28 PM    BUN 12 2022 04:28 PM    CREATININE 0.82 2022 04:28 PM    GLUCOSE 113 2022 04:28 PM       HEMOGLOBIN A1C:   Lab Results   Component Value Date/Time    LABA1C 6.3 2022 10:53 AM       FASTING LIPID PANEL:No results found for: CHOL, HDL, TRIG    Physical Exam       An electronic signature was used to authenticate this note.     --Macey Zaragoza, MEKA - CNP

## 2022-11-03 NOTE — PROGRESS NOTES
[facility]  [address] [address]  Dept: [phone]  Dept Fax: [phone]    Horace Mera (:  1980) is a 36 y.o. male,Established patient, here for evaluation of the following chief complaint(s):  Established New Doctor, Diabetes, Medication Refill, and Cough       ASSESSMENT/PLAN:  URI symptoms improving with OTC remedies; advised to continue as needed. A1C in office reviewed with patient- patient states he recently moved and had difficulty sticking to healthy choices but continues to follow with Diabetic Education program-- advised to continue. Medications reviewed. 1. Type 2 diabetes mellitus with hyperosmolarity without coma, with long-term current use of insulin (HCC)  -     POCT glycosylated hemoglobin (Hb A1C)  -     Lipid Panel; Future  -     atorvastatin (LIPITOR) 20 MG tablet; Take 1 tablet by mouth daily, Disp-90 tablet, R-3Normal  -     FARXIGA 10 MG tablet; Take 1 tablet by mouth every morning, Disp-90 tablet, R-5, DAWNormal  -     glucose (RELION GLUCOSE) 40 % GEL; Take 37.5 mLs by mouth See Admin Instructions, Disp-45 g, R-1Normal  -     insulin lispro (HUMALOG) 100 UNIT/ML injection cartridge; Inject 6 Units into the skin 3 times daily (before meals), Disp-4 each, R-5Normal  -     insulin glargine (LANTUS SOLOSTAR) 100 UNIT/ML injection pen; INJECT 20 UNITS INTO THE SKIN ONCE DAILY AT NIGHT., Disp-15 mL, R-5Normal  -     lisinopril (PRINIVIL;ZESTRIL) 5 MG tablet; TAKE 1 TABLET BY MOUTH ONCE DAILY. , Disp-90 tablet, R-0Normal  -     metFORMIN (GLUCOPHAGE) 500 MG tablet; Take 1 tablet by mouth daily (with breakfast), Disp-90 tablet, R-5Normal  -     blood glucose monitor strips; Test 3 times a day & as needed for symptoms of irregular blood glucose. Dispense sufficient amount for indicated testing frequency plus additional to accommodate PRN testing needs. , Disp-100 strip, R-1, Normal  2. Essential hypertension  -     Lipid Panel; Future  -     lisinopril (PRINIVIL;ZESTRIL) 5 MG tablet; TAKE 1 TABLET BY MOUTH ONCE DAILY. , Disp-90 tablet, R-0Normal  3. Viral upper respiratory tract infection    Return in about 8 weeks (around 12/29/2022), or if symptoms worsen or fail to improve. Subjective   SUBJECTIVE/OBJECTIVE:  Cough  This is a new problem. The current episode started in the past 7 days. The problem has been rapidly improving. The cough is Non-productive. Associated symptoms include nasal congestion. Pertinent negatives include no chest pain, eye redness, fever, headaches or shortness of breath. Nothing aggravates the symptoms. He has tried OTC cough suppressant for the symptoms. The treatment provided significant relief. Review of Systems   Constitutional:  Negative for activity change, appetite change and fever. HENT:  Positive for congestion, dental problem and sinus pressure. Eyes:  Negative for discharge and redness. Respiratory:  Positive for cough. Negative for chest tightness and shortness of breath. Cardiovascular:  Negative for chest pain. Gastrointestinal:  Negative for nausea and vomiting. Neurological:  Negative for dizziness and headaches. Objective     DIAGNOSTIC FINDINGS:  CBC:  Lab Results   Component Value Date/Time    WBC 8.5 03/29/2022 04:28 PM    HGB 15.0 03/29/2022 04:28 PM     03/29/2022 04:28 PM       BMP:    Lab Results   Component Value Date/Time     03/29/2022 04:28 PM    K 4.4 03/29/2022 04:28 PM     03/29/2022 04:28 PM    CO2 28 03/29/2022 04:28 PM    BUN 12 03/29/2022 04:28 PM    CREATININE 0.82 03/29/2022 04:28 PM    GLUCOSE 113 03/29/2022 04:28 PM       HEMOGLOBIN A1C:   Lab Results   Component Value Date/Time    LABA1C 7.6 11/03/2022 08:28 AM       FASTING LIPID PANEL:No results found for: CHOL, HDL, TRIG    Physical Exam  Constitutional:       General: He is not in acute distress.      Appearance: Normal appearance. He is not ill-appearing. HENT:      Head: Normocephalic. Right Ear: External ear normal. There is impacted cerumen. Left Ear: External ear normal. There is no impacted cerumen. Nose: Congestion present. No rhinorrhea. Mouth/Throat:      Mouth: Mucous membranes are dry. Pharynx: Oropharynx is clear. Eyes:      Conjunctiva/sclera: Conjunctivae normal.      Pupils: Pupils are equal, round, and reactive to light. Cardiovascular:      Rate and Rhythm: Normal rate and regular rhythm. Heart sounds: Normal heart sounds. No murmur heard. Pulmonary:      Effort: Pulmonary effort is normal. No respiratory distress. Breath sounds: Normal breath sounds. No wheezing. Abdominal:      General: Abdomen is flat. Palpations: Abdomen is soft. Musculoskeletal:      Cervical back: Normal range of motion. Skin:     General: Skin is warm and dry. Neurological:      General: No focal deficit present. Mental Status: He is alert and oriented to person, place, and time. Mental status is at baseline. Psychiatric:         Mood and Affect: Mood normal.          An electronic signature was used to authenticate this note.     --Sharyle Shores, MEKA - CNP

## 2022-11-03 NOTE — PROGRESS NOTES
Visit Information    Have you changed or started any medications since your last visit including any over-the-counter medicines, vitamins, or herbal medicines? no   Are you having any side effects from any of your medications? -  no  Have you stopped taking any of your medications? Is so, why? -  no    Have you seen any other physician or provider since your last visit? Yes - Records Obtained  Have you had any other diagnostic tests since your last visit? Yes - Records Obtained  Have you been seen in the emergency room and/or had an admission to a hospital since we last saw you? Yes - Records Obtained  Have you had your routine dental cleaning in the past 6 months? no    Have you activated your [employer] [place] account? If not, what are your barriers?  No: Declined     Patient Care Team:  MEKA Torrez - CNP as PCP - General (Certified Nurse Practitioner)  Anjel Schmidt MD as PCP - [facility] Provider    Medical History Review  Past Medical, Family, and Social History reviewed and does not contribute to the patient presenting condition    Health Maintenance   Topic Date Due    Pneumococcal 0-64 years Vaccine (1 - PCV) Never done    Diabetic foot exam  Never done    Lipids  Never done    HIV screen  Never done    Diabetic retinal exam  Never done    Hepatitis B vaccine (1 of 3 - Risk 3-dose series) Never done    COVID-19 Vaccine (2 - Booster for Isai series) 01/07/2022    Flu vaccine (1) 11/03/2023 (Originally 8/1/2022)    Depression Screen  01/13/2023    Diabetic microalbuminuria test  03/29/2023    A1C test (Diabetic or Prediabetic)  04/14/2023    DTaP/Tdap/Td vaccine (6 - Td or Tdap) 01/13/2032    Hepatitis C screen  Completed    Hepatitis A vaccine  Aged Out    Hib vaccine  Aged Out    Meningococcal (ACWY) vaccine  Aged Out

## 2022-12-29 ENCOUNTER — OFFICE VISIT (OUTPATIENT)
Dept: PRIMARY CARE CLINIC | Age: 42
End: 2022-12-29
Payer: COMMERCIAL

## 2022-12-29 VITALS
SYSTOLIC BLOOD PRESSURE: 111 MMHG | WEIGHT: 143.6 LBS | OXYGEN SATURATION: 95 % | DIASTOLIC BLOOD PRESSURE: 66 MMHG | BODY MASS INDEX: 23.18 KG/M2 | HEART RATE: 79 BPM

## 2022-12-29 DIAGNOSIS — M54.50 LOW BACK PAIN WITHOUT SCIATICA, UNSPECIFIED BACK PAIN LATERALITY, UNSPECIFIED CHRONICITY: ICD-10-CM

## 2022-12-29 DIAGNOSIS — I10 ESSENTIAL HYPERTENSION: ICD-10-CM

## 2022-12-29 DIAGNOSIS — H66.002 NON-RECURRENT ACUTE SUPPURATIVE OTITIS MEDIA OF LEFT EAR WITHOUT SPONTANEOUS RUPTURE OF TYMPANIC MEMBRANE: ICD-10-CM

## 2022-12-29 DIAGNOSIS — E11.00 TYPE 2 DIABETES MELLITUS WITH HYPEROSMOLARITY WITHOUT COMA, WITH LONG-TERM CURRENT USE OF INSULIN (HCC): Primary | ICD-10-CM

## 2022-12-29 DIAGNOSIS — Z79.4 TYPE 2 DIABETES MELLITUS WITH HYPEROSMOLARITY WITHOUT COMA, WITH LONG-TERM CURRENT USE OF INSULIN (HCC): Primary | ICD-10-CM

## 2022-12-29 PROCEDURE — 3051F HG A1C>EQUAL 7.0%<8.0%: CPT | Performed by: NURSE PRACTITIONER

## 2022-12-29 PROCEDURE — G8420 CALC BMI NORM PARAMETERS: HCPCS | Performed by: NURSE PRACTITIONER

## 2022-12-29 PROCEDURE — 99213 OFFICE O/P EST LOW 20 MIN: CPT | Performed by: NURSE PRACTITIONER

## 2022-12-29 PROCEDURE — 3078F DIAST BP <80 MM HG: CPT | Performed by: NURSE PRACTITIONER

## 2022-12-29 PROCEDURE — 1036F TOBACCO NON-USER: CPT | Performed by: NURSE PRACTITIONER

## 2022-12-29 PROCEDURE — G8484 FLU IMMUNIZE NO ADMIN: HCPCS | Performed by: NURSE PRACTITIONER

## 2022-12-29 PROCEDURE — G8427 DOCREV CUR MEDS BY ELIG CLIN: HCPCS | Performed by: NURSE PRACTITIONER

## 2022-12-29 PROCEDURE — 2022F DILAT RTA XM EVC RTNOPTHY: CPT | Performed by: NURSE PRACTITIONER

## 2022-12-29 PROCEDURE — 3074F SYST BP LT 130 MM HG: CPT | Performed by: NURSE PRACTITIONER

## 2022-12-29 RX ORDER — ACETAMINOPHEN 500 MG
500 TABLET ORAL 4 TIMES DAILY PRN
Qty: 120 TABLET | Refills: 0 | Status: SHIPPED | OUTPATIENT
Start: 2022-12-29

## 2022-12-29 RX ORDER — NEOMYCIN SULFATE, POLYMYXIN B SULFATE, HYDROCORTISONE 3.5; 10000; 1 MG/ML; [USP'U]/ML; MG/ML
2 SOLUTION/ DROPS AURICULAR (OTIC) EVERY 8 HOURS SCHEDULED
Qty: 10 ML | Refills: 1 | Status: SHIPPED | OUTPATIENT
Start: 2022-12-29 | End: 2023-01-08

## 2022-12-29 ASSESSMENT — ENCOUNTER SYMPTOMS
COLOR CHANGE: 0
PHOTOPHOBIA: 0
SORE THROAT: 0
SINUS PRESSURE: 0
BACK PAIN: 0
SHORTNESS OF BREATH: 0
DIARRHEA: 0
SINUS PAIN: 0
CHEST TIGHTNESS: 0
ABDOMINAL PAIN: 0
VOMITING: 0
COUGH: 0
NAUSEA: 0

## 2022-12-29 ASSESSMENT — PATIENT HEALTH QUESTIONNAIRE - PHQ9
SUM OF ALL RESPONSES TO PHQ9 QUESTIONS 1 & 2: 0
1. LITTLE INTEREST OR PLEASURE IN DOING THINGS: 0
SUM OF ALL RESPONSES TO PHQ QUESTIONS 1-9: 0
2. FEELING DOWN, DEPRESSED OR HOPELESS: 0
SUM OF ALL RESPONSES TO PHQ QUESTIONS 1-9: 0

## 2022-12-29 NOTE — PROGRESS NOTES
[facility]  [address] [address]  Dept: [phone]       Bright Ramos is a 36 y.o. male who presents today for his  medical conditions/complaintsas noted below. Bright Ramos is c/o of URI (Follow up) and Ear Fullness (Pt states his left ear is muffled, and feels full. Pt states it has been going on for awhile.)      HPI:     HPI    Patient is here for a week follow-up. Blood pressure is well controlled and patient states his blood sugars have been running 120s-130s at home. He endorses medication compliance and states his diet has improved since his last visit. He tells me his diet has improved and he denies any episodes of hypoglycemia. Next A1c check will be on next visit in 2 months. Patient is well complaining of left ear fullness and pain. There is effusion and erythema noted with some external discomfort. He denies any fevers, but does tell me he needs to get into his dentist due to multiple dental caries. Patient instructed to call his insurance and obtain phone numbers for excepted dental providers. No past medical history on file. No past surgical history on file.   Family History   Family history unknown: Yes     Social History     Tobacco Use    Smoking status: Never    Smokeless tobacco: Never   Substance Use Topics    Alcohol use: Never      Current Outpatient Medications   Medication Sig Dispense Refill    neomycin-polymyxin-hydrocortisone 1 % SOLN otic solution Place 2 drops into the left ear every 8 hours for 10 days 10 mL 1    acetaminophen (TYLENOL) 500 MG tablet Take 1 tablet by mouth 4 times daily as needed for Pain 120 tablet 0    atorvastatin (LIPITOR) 20 MG tablet Take 1 tablet by mouth daily 90 tablet 3    FARXIGA 10 MG tablet Take 1 tablet by mouth every morning 90 tablet 5    glucose (RELION GLUCOSE) 40 % GEL Take 37.5 mLs by mouth See Admin Instructions 45 g 1    insulin Depression Screen  01/13/2023    Flu vaccine (1) 11/03/2023 (Originally 8/1/2022)    GFR test (Diabetes, CKD 3-4, OR last GFR 15-59)  03/29/2023    A1C test (Diabetic or Prediabetic)  11/03/2023    DTaP/Tdap/Td vaccine (6 - Td or Tdap) 01/13/2032    Hepatitis C screen  Completed    Hepatitis A vaccine  Aged Out    Hib vaccine  Aged Out    Meningococcal (ACWY) vaccine  Aged Out       Review of Systems   Constitutional:  Negative for activity change, appetite change and fever. HENT:  Positive for dental problem and ear pain. Negative for sinus pressure, sinus pain and sore throat. Eyes:  Negative for photophobia and visual disturbance. Respiratory:  Negative for cough, chest tightness and shortness of breath. Cardiovascular:  Negative for chest pain. Gastrointestinal:  Negative for abdominal pain, diarrhea, nausea and vomiting. Endocrine: Negative for polydipsia and polyuria. Genitourinary:  Negative for difficulty urinating. Musculoskeletal:  Negative for back pain and neck pain. Skin:  Negative for color change. Neurological:  Negative for dizziness, light-headedness and headaches. Psychiatric/Behavioral:  Negative for behavioral problems. Objective:     Physical Exam  Vitals and nursing note reviewed. Constitutional:       General: He is not in acute distress. Appearance: Normal appearance. HENT:      Head: Normocephalic. Right Ear: Tympanic membrane and external ear normal.      Left Ear: External ear normal. Tenderness present. A middle ear effusion is present. Tympanic membrane is erythematous. Tympanic membrane is not perforated. Nose: Nose normal. No congestion or rhinorrhea. Mouth/Throat:      Mouth: Mucous membranes are moist.      Pharynx: Oropharynx is clear. No oropharyngeal exudate. Eyes:      Conjunctiva/sclera: Conjunctivae normal.      Pupils: Pupils are equal, round, and reactive to light.    Cardiovascular:      Rate and Rhythm: Normal rate and regular rhythm. Pulses: Normal pulses. Heart sounds: Normal heart sounds. No murmur heard. Pulmonary:      Effort: Pulmonary effort is normal. No respiratory distress. Breath sounds: Normal breath sounds. No wheezing. Abdominal:      Palpations: Abdomen is soft. Tenderness: There is no abdominal tenderness. Musculoskeletal:         General: No swelling or signs of injury. Normal range of motion. Cervical back: Normal range of motion. Skin:     General: Skin is warm and dry. Capillary Refill: Capillary refill takes less than 2 seconds. Neurological:      General: No focal deficit present. Mental Status: He is alert and oriented to person, place, and time. Mental status is at baseline. Motor: No weakness. Gait: Gait normal.   Psychiatric:         Behavior: Behavior normal.       Assessment:      No results found for this visit on 12/29/22. Jyoti Meredith was seen today for uri and ear fullness. Diagnoses and all orders for this visit:    Type 2 diabetes mellitus with hyperosmolarity without coma, with long-term current use of insulin ([facility])    Essential hypertension    Low back pain without sciatica, unspecified back pain laterality, unspecified chronicity  -     acetaminophen (TYLENOL) 500 MG tablet; Take 1 tablet by mouth 4 times daily as needed for Pain    Non-recurrent acute suppurative otitis media of left ear without spontaneous rupture of tympanic membrane  -     neomycin-polymyxin-hydrocortisone 1 % SOLN otic solution; Place 2 drops into the left ear every 8 hours for 10 days        Return in about 8 weeks (around 2/23/2023). Plan of care reviewed with patient. Questions and concerns addressed to patient satisfaction. Follow up as directed.      Electronically signed by MEKA Troy CNP, PACon 12/29/2022 at 11:11 AM

## 2023-01-30 ENCOUNTER — APPOINTMENT (OUTPATIENT)
Dept: GENERAL RADIOLOGY | Age: 43
End: 2023-01-30
Payer: COMMERCIAL

## 2023-01-30 ENCOUNTER — HOSPITAL ENCOUNTER (EMERGENCY)
Age: 43
Discharge: HOME | End: 2023-01-30
Attending: EMERGENCY MEDICINE
Payer: COMMERCIAL

## 2023-01-30 VITALS
HEIGHT: 65 IN | WEIGHT: 140 LBS | OXYGEN SATURATION: 96 % | TEMPERATURE: 97.5 F | DIASTOLIC BLOOD PRESSURE: 82 MMHG | RESPIRATION RATE: 16 BRPM | HEART RATE: 82 BPM | SYSTOLIC BLOOD PRESSURE: 111 MMHG | BODY MASS INDEX: 23.32 KG/M2

## 2023-01-30 DIAGNOSIS — R07.81 RIB PAIN ON RIGHT SIDE: ICD-10-CM
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
## 2023-01-30 LAB
BILIRUBIN URINE: NEGATIVE
COLOR: YELLOW
COMMENT UA: ABNORMAL
GLUCOSE URINE: ABNORMAL
KETONES, URINE: ABNORMAL
LEUKOCYTE ESTERASE, URINE: NEGATIVE
NITRITE, URINE: NEGATIVE
PH UA: 5.5 (ref 5–8)
PROTEIN UA: NEGATIVE
SPECIFIC GRAVITY UA: 1.05 (ref 1–1.03)
TURBIDITY: CLEAR
URINE HGB: NEGATIVE
UROBILINOGEN, URINE: NORMAL

## 2023-01-30 PROCEDURE — 96372 THER/PROPH/DIAG INJ SC/IM: CPT

## 2023-01-30 PROCEDURE — 6370000000 HC RX 637 (ALT 250 FOR IP): Performed by: HEALTH CARE PROVIDER

## 2023-01-30 PROCEDURE — 71046 X-RAY EXAM CHEST 2 VIEWS: CPT

## 2023-01-30 PROCEDURE — 6360000002 HC RX W HCPCS: Performed by: HEALTH CARE PROVIDER

## 2023-01-30 PROCEDURE — 99284 EMERGENCY DEPT VISIT MOD MDM: CPT

## 2023-01-30 PROCEDURE — 81003 URINALYSIS AUTO W/O SCOPE: CPT

## 2023-01-30 RX ORDER — IBUPROFEN 600 MG/1
600 TABLET ORAL EVERY 6 HOURS PRN
Qty: 60 TABLET | Refills: 0 | Status: SHIPPED | OUTPATIENT
Start: 2023-01-30

## 2023-01-30 RX ORDER — HYDROCODONE BITARTRATE AND ACETAMINOPHEN 5; 325 MG/1; MG/1
1 TABLET ORAL EVERY 8 HOURS PRN
Qty: 6 TABLET | Refills: 0 | Status: SHIPPED | OUTPATIENT
Start: 2023-01-30 | End: 2023-02-02

## 2023-01-30 RX ORDER — ORPHENADRINE CITRATE 30 MG/ML
60 INJECTION INTRAMUSCULAR; INTRAVENOUS ONCE
Status: DISCONTINUED | OUTPATIENT
Start: 2023-01-30 | End: 2023-01-30

## 2023-01-30 RX ORDER — CYCLOBENZAPRINE HCL 10 MG
10 TABLET ORAL 3 TIMES DAILY PRN
Qty: 15 TABLET | Refills: 0 | Status: SHIPPED | OUTPATIENT
Start: 2023-01-30 | End: 2023-02-04

## 2023-01-30 RX ORDER — KETOROLAC TROMETHAMINE 30 MG/ML
30 INJECTION, SOLUTION INTRAMUSCULAR; INTRAVENOUS ONCE
Status: COMPLETED | OUTPATIENT
Start: 2023-01-30 | End: 2023-01-30

## 2023-01-30 RX ORDER — LIDOCAINE 4 G/G
1 PATCH TOPICAL DAILY
Status: DISCONTINUED | OUTPATIENT
Start: 2023-01-30 | End: 2023-01-30 | Stop reason: HOSPADM

## 2023-01-30 RX ORDER — LIDOCAINE 50 MG/G
1 PATCH TOPICAL DAILY
Qty: 10 PATCH | Refills: 0 | Status: SHIPPED | OUTPATIENT
Start: 2023-01-30 | End: 2023-02-09

## 2023-01-30 RX ORDER — HYDROCODONE BITARTRATE AND ACETAMINOPHEN 5; 325 MG/1; MG/1
1 TABLET ORAL ONCE
Status: COMPLETED | OUTPATIENT
Start: 2023-01-30 | End: 2023-01-30

## 2023-01-30 RX ORDER — CYCLOBENZAPRINE HCL 10 MG
10 TABLET ORAL ONCE
Status: COMPLETED | OUTPATIENT
Start: 2023-01-30 | End: 2023-01-30

## 2023-01-30 RX ADMIN — HYDROCODONE BITARTRATE AND ACETAMINOPHEN 1 TABLET: 5; 325 TABLET ORAL at 09:37

## 2023-01-30 RX ADMIN — KETOROLAC TROMETHAMINE 30 MG: 30 INJECTION, SOLUTION INTRAMUSCULAR; INTRAVENOUS at 09:37

## 2023-01-30 RX ADMIN — CYCLOBENZAPRINE 10 MG: 10 TABLET, FILM COATED ORAL at 10:40

## 2023-01-30 ASSESSMENT — PAIN DESCRIPTION - LOCATION
LOCATION: RIB CAGE
LOCATION: RIB CAGE

## 2023-01-30 ASSESSMENT — ENCOUNTER SYMPTOMS
VOMITING: 0
ABDOMINAL PAIN: 0
CONSTIPATION: 0
SHORTNESS OF BREATH: 0
NAUSEA: 0
SORE THROAT: 0
DIARRHEA: 0

## 2023-01-30 ASSESSMENT — PAIN SCALES - GENERAL
PAINLEVEL_OUTOF10: 8
PAINLEVEL_OUTOF10: 10

## 2023-01-30 ASSESSMENT — PAIN DESCRIPTION - ORIENTATION
ORIENTATION: RIGHT
ORIENTATION: RIGHT

## 2023-01-30 NOTE — ED NOTES
Patient ambulated to restroom with steady gait. Reports pain improved after medicaion.      Tran Hernandez RN  01/30/23 0103

## 2023-01-30 NOTE — ED NOTES
Pt arrived to ED alert and oriented x4 via triage. Pt c/o rib pain s/p fall. Pt reports that he slipped on ice landing on the right side of his body. Pt reports pain to the right side of his ribs, denies SOB or diff breathing. Pt denies having been around anyone suspected to have COVID-19 or anyone that has been sick, denies recent travel outside the [facility] or [address]. Pt placed on continuous pulse ox and BP cuff. RR even and unlabored. NAD noted. Whiteboard updated. Will continue with plan of care.      David Denson RN  01/30/23 8090

## 2023-01-30 NOTE — ED PROVIDER NOTES
[address] [facility]     Emergency Department     Faculty Attestation        I performed a history and physical examination of the patient and discussed management with the resident. I reviewed the residents note and agree with the documented findings and plan of care. Any areas of disagreement are noted on the chart. I was personally present for the key portions of any procedures. I have documented in the chart those procedures where I was not present during the key portions. I have reviewed the emergency nurses triage note. I agree with the chief complaint, past medical history, past surgical history, allergies, medications, social and family history as documented unless otherwise noted below. For Physician Assistant/ Nurse Practitioner cases/documentation I have personally evaluated this patient and have completed at least one if not all key elements of the E/M (history, physical exam, and MDM). Additional findings are as noted. Vital Signs: BP: (!) 125/93  Heart Rate: 99  Resp: 20  Temp: 97.5 °F (36.4 °C) SpO2: 95 %  PCP:  MEKA Simpson - CNP    Pertinent Comments:         Critical Care  None      (Please note that portions of this note were completed with a voice recognition program. Efforts were made to edit the dictations but occasionally words are mis-transcribed.  Whenever words are used in this note in any gender, they shall be construed as though they were used in the gender appropriate to the circumstances; and whenever words are used in this note in the singular or plural form, they shall be construed as though they were used in the form appropriate to the circumstances.)    MD Corin Casillas  Attending Emergency Medicine Physician            Radha Peterson MD  01/30/23 0701

## 2023-01-30 NOTE — ED PROVIDER NOTES
[address]  ED  Emergency Department Encounter  Emergency Medicine Resident     Pt Name:Baron Haskins  MRN: [id number]  [place] 1980  Date of evaluation: 1/30/23  PCP:  MEKA Portillo CNP  Note Started: 10:58 AM EST      CHIEF COMPLAINT       Chief Complaint   Patient presents with    Rib Pain (injury)     Right side of ribs, states he fell down after slipping on ice       HISTORY OF PRESENT ILLNESS  (Location/Symptom, Timing/Onset, Context/Setting, Quality, Duration, Modifying Factors, Severity.)      Marimar James is a 36 y.o. male who presents with right-sided rib and flank pain. Patient states that he slipped on some ice last night and fell, based 5 or 6 steps and landed directly onto his right side. Patient denies any shortness of breath or difficulty breathing, denies any abdominal pain, nausea or vomiting, denies any hematuria. Did not hit his head and denies any loss conscious. PAST MEDICAL / SURGICAL / SOCIAL / FAMILY HISTORY      has no past medical history on file. Denies any significant past medical history     has no past surgical history on file.   Denies any significant past surgical history    Social History     Socioeconomic History    Marital status: Single     Spouse name: Not on file    Number of children: Not on file    Years of education: Not on file    Highest education level: Not on file   Occupational History    Not on file   Tobacco Use    Smoking status: Never    Smokeless tobacco: Never   Vaping Use    Vaping Use: Never used   Substance and Sexual Activity    Alcohol use: Never    Drug use: Never    Sexual activity: Not on file   Other Topics Concern    Not on file   Social History Narrative    Not on file     Social Determinants of Health     Financial Resource Strain: Not on file   Food Insecurity: Not on file   Transportation Needs: Not on file   Physical Activity: Not on file   Stress: Not on file   Social Connections: Not on file   Intimate Partner Violence: Not on file   Housing Stability: Not on file       Family History   Family history unknown: Yes       Allergies:  Patient has no known allergies.    Home Medications:  Prior to Admission medications    Medication Sig Start Date End Date Taking? Authorizing Provider   HYDROcodone-acetaminophen (NORCO) 5-325 MG per tablet Take 1 tablet by mouth every 8 hours as needed for Pain for up to 3 days. Intended supply: 3 days. Take lowest dose possible to manage pain Max Daily Amount: 3 tablets 1/30/23 2/2/23 Yes Mani Gautam DO   ibuprofen (IBU) 600 MG tablet Take 1 tablet by mouth every 6 hours as needed for Pain 1/30/23  Yes Mani Gautam DO   cyclobenzaprine (FLEXERIL) 10 MG tablet Take 1 tablet by mouth 3 times daily as needed for Muscle spasms 1/30/23 2/4/23 Yes Mani Gautam DO   lidocaine (LIDODERM) 5 % Place 1 patch onto the skin daily for 10 days 12 hours on, 12 hours off. 1/30/23 2/9/23 Yes Mani Gautam DO   acetaminophen (TYLENOL) 500 MG tablet Take 1 tablet by mouth 4 times daily as needed for Pain 12/29/22   MEKA Freire CNP   atorvastatin (LIPITOR) 20 MG tablet Take 1 tablet by mouth daily 11/3/22   MEKA Freire CNP   FARXIGA 10 MG tablet Take 1 tablet by mouth every morning 11/3/22   MEKA Freire CNP   glucose (RELION GLUCOSE) 40 % GEL Take 37.5 mLs by mouth See Admin Instructions 11/3/22   MEKA Freire CNP   insulin lispro (HUMALOG) 100 UNIT/ML injection cartridge Inject 6 Units into the skin 3 times daily (before meals) 11/3/22   MEKA Freire CNP   Insulin Pen Needle (PEN NEEDLES) 32G X 4 MM MISC Inject 100 each as directed in the morning, at noon, and at bedtime 11/3/22   MEKA Freire CNP   insulin glargine (LANTUS SOLOSTAR) 100 UNIT/ML injection pen INJECT 20 UNITS INTO THE SKIN ONCE DAILY AT NIGHT. 11/3/22   MEKA Freire CNP   lisinopril (PRINIVIL;ZESTRIL) 5 MG tablet TAKE 1 TABLET BY MOUTH ONCE DAILY. 11/3/22   MEKA Freire  - CNP   metFORMIN (GLUCOPHAGE) 500 MG tablet Take 1 tablet by mouth daily (with breakfast) 11/3/22   MEKA Genao CNP   TRUEplus Lancets 33G MISC USE AS DIRECTED TO TEST BLOOD SUGAR TWO TIMES A DAY 11/3/22   MEKA Genao CNP   Alcohol Swabs (ALCOHOL PREP) 70 % PADS USE AS DIRECTED TWICE DAILY. 11/3/22   MEKA Genao CNP   blood glucose monitor strips Test 3 times a day & as needed for symptoms of irregular blood glucose. Dispense sufficient amount for indicated testing frequency plus additional to accommodate PRN testing needs. 11/3/22   MEKA Genao CNP   blood glucose monitor strips Test 3 times a day & as needed for symptoms of irregular blood glucose. Dispense sufficient amount for indicated testing frequency plus additional to accommodate PRN testing needs. 2/16/22   Clark Friday, MD   blood glucose monitor kit and supplies Dispense sufficient amount for indicated testing frequency plus additional to accommodate PRN testing needs. Dispense all needed supplies to include: monitor, strips, lancing device, lancets, control solutions, alcohol swabs. 1/14/22   Charline Flores MD     REVIEW OF SYSTEMS       Review of Systems   Constitutional:  Negative for chills and fever. HENT:  Negative for ear pain, hearing loss and sore throat. Eyes:  Negative for visual disturbance. Respiratory:  Negative for shortness of breath. Cardiovascular:  Negative for chest pain. Gastrointestinal:  Negative for abdominal pain, constipation, diarrhea, nausea and vomiting. Genitourinary:  Positive for flank pain. Negative for difficulty urinating and dysuria. Musculoskeletal:  Negative for arthralgias and myalgias. R rib pain   Neurological:  Negative for numbness. Psychiatric/Behavioral:  Negative for agitation and confusion.       PHYSICAL EXAM      INITIAL VITALS:   /82   Pulse 82   Temp 97.5 °F (36.4 °C) (Oral)   Resp 16   Ht 5' 5\" (1.651 m)   Wt 140 lb (63.5 kg) SpO2 96%   BMI 23.30 kg/m²     Physical Exam  Vitals and nursing note reviewed. Constitutional:       General: He is not in acute distress. Appearance: He is well-developed. He is not diaphoretic. HENT:      Head: Normocephalic and atraumatic. Right Ear: External ear normal.      Left Ear: External ear normal.      Nose: Nose normal.   Eyes:      Conjunctiva/sclera: Conjunctivae normal.   Neck:      Trachea: No tracheal deviation. Cardiovascular:      Rate and Rhythm: Normal rate and regular rhythm. Heart sounds: Normal heart sounds. No murmur heard. No friction rub. No gallop. Pulmonary:      Effort: Pulmonary effort is normal. No respiratory distress. Breath sounds: Normal breath sounds. Abdominal:      General: Bowel sounds are normal.      Palpations: Abdomen is soft. Tenderness: There is no abdominal tenderness. There is right CVA tenderness. Comments: R flank pain on palpation   Musculoskeletal:         General: Tenderness present. No swelling, deformity or signs of injury. Normal range of motion. Cervical back: Neck supple. Right lower leg: No edema. Left lower leg: No edema. Comments: Pain to palpation R 5th-10th ribs   Skin:     General: Skin is warm and dry. Capillary Refill: Capillary refill takes less than 2 seconds. Neurological:      Mental Status: He is alert and oriented to person, place, and time. Motor: No abnormal muscle tone. DDX/DIAGNOSTIC RESULTS / EMERGENCY DEPARTMENT COURSE / MDM     Medical Decision Making  Patient is a 50-year-old male presents today with worsening right-sided pain after falling down the stairs last night. At time of initial examination patient was in no acute distress, will vital signs stable exam.  He is saturating 96% on room air, lung sounds are clear bilaterally. His exam findings otherwise as above, low concern for pneumothorax, likely rib contusion versus fracture.   We will plan for chest x-ray, urinalysis to evaluate for any hematuria for potential kidney injury as he fell on his right side. We will attempt analgesia. Likely discharge home. Amount and/or Complexity of Data Reviewed  Labs: ordered. Decision-making details documented in ED Course. Radiology: ordered and independent interpretation performed. Decision-making details documented in ED Course. Risk  OTC drugs. Prescription drug management. EKG  All EKG's are interpreted by the Emergency Department Physician who either signs or Co-signs this chart in the absence of a cardiologist.    EMERGENCY DEPARTMENT COURSE:    ED Course as of 01/30/23 1746   Mon Jan 30, 2023   6137 Patient is a 26-year-old male, slipped on ice last night and fell down about 7 steps. Presents today with worsening right-sided chest/rib pain, right sided flank pain. [JS]   0022 Patient in restroom now, urinalysis pending, awaiting final CXR read. [JS]   7413 Patient updated on results and plan of care. We will plan to discharge at this time. Patient has a ride home. Follow up plans and ER return precautions discussed. Patient verbalized understanding and had no further questions at time of discharge. [JS]      ED Course User Index  [JS] Rayo De Leon,        PROCEDURES:    CONSULTS:  None    CRITICAL CARE:    FINAL IMPRESSION      1. Fall from stairs and steps due to ice and snow, initial encounter    2.  Rib pain on right side          DISPOSITION / PLAN     DISPOSITION Decision To Discharge 01/30/2023 12:49:57 PM      PATIENT REFERRED TO:  MEKA Torrez CNP  [address]  [facility] [address] [place] [address]  [phone]    Call in 1 day  To discuss this ER visit and any further follow up needed    [facility]  [address] [facility]  [phone]  Go to   As needed, If symptoms worsen    DISCHARGE MEDICATIONS:  Discharge Medication List as of 1/30/2023 12:57 PM        START taking these medications Details   HYDROcodone-acetaminophen (NORCO) 5-325 MG per tablet Take 1 tablet by mouth every 8 hours as needed for Pain for up to 3 days. Intended supply: 3 days.  Take lowest dose possible to manage pain Max Daily Amount: 3 tablets, Disp-6 tablet, R-0Print      ibuprofen (IBU) 600 MG tablet Take 1 tablet by mouth every 6 hours as needed for Pain, Disp-60 tablet, R-0Print      cyclobenzaprine (FLEXERIL) 10 MG tablet Take 1 tablet by mouth 3 times daily as needed for Muscle spasms, Disp-15 tablet, R-0Print      lidocaine (LIDODERM) 5 % Place 1 patch onto the skin daily for 10 days 12 hours on, 12 hours off., Disp-10 patch, R-0Can substitute with 4%Print             Maricel Gray DO  Emergency Medicine Resident    (Please note that portions of thisnote were completed with a voice recognition program.  Efforts were made to edit the dictations but occasionally words are mis-transcribed.)      Rayo De Leon DO  Resident  01/30/23 8332

## 2023-01-30 NOTE — DISCHARGE INSTRUCTIONS
You were seen today for concerns of pain in your side after a fall. He had a chest x-ray at this time which did not show any obvious signs of large rib fractures. We are discharging you with some medication help with pain this time. Flexeril is a muscle relaxant, can make you drowsy, please do not drive or operate any machinery after taking this medication. Please only take the medication Norco for severe pain. Do not drive or operate any machinery after taking this medication as well. Call your primary care physician tomorrow to discuss this emergency room visit and any further follow-up needed. Return emerged part for the following: Any worsening belly pain, nausea or vomiting, worsening pain or side, bloody urine, any other care or concern.

## 2023-02-17 ENCOUNTER — TELEPHONE (OUTPATIENT)
Dept: PRIMARY CARE CLINIC | Age: 43
End: 2023-02-17

## 2023-03-09 ENCOUNTER — OFFICE VISIT (OUTPATIENT)
Dept: PRIMARY CARE CLINIC | Age: 43
End: 2023-03-09
Payer: COMMERCIAL

## 2023-03-09 VITALS
BODY MASS INDEX: 23.66 KG/M2 | SYSTOLIC BLOOD PRESSURE: 108 MMHG | HEIGHT: 65 IN | DIASTOLIC BLOOD PRESSURE: 74 MMHG | WEIGHT: 142 LBS | HEART RATE: 86 BPM | OXYGEN SATURATION: 98 %

## 2023-03-09 DIAGNOSIS — Z79.4 TYPE 2 DIABETES MELLITUS WITH HYPEROSMOLARITY WITHOUT COMA, WITH LONG-TERM CURRENT USE OF INSULIN (HCC): Primary | ICD-10-CM

## 2023-03-09 DIAGNOSIS — R07.81 PAINFUL RIB: ICD-10-CM

## 2023-03-09 DIAGNOSIS — E11.00 TYPE 2 DIABETES MELLITUS WITH HYPEROSMOLARITY WITHOUT COMA, WITH LONG-TERM CURRENT USE OF INSULIN (HCC): Primary | ICD-10-CM

## 2023-03-09 DIAGNOSIS — I10 ESSENTIAL HYPERTENSION: ICD-10-CM

## 2023-03-09 PROCEDURE — 2022F DILAT RTA XM EVC RTNOPTHY: CPT | Performed by: NURSE PRACTITIONER

## 2023-03-09 PROCEDURE — G8484 FLU IMMUNIZE NO ADMIN: HCPCS | Performed by: NURSE PRACTITIONER

## 2023-03-09 PROCEDURE — G8427 DOCREV CUR MEDS BY ELIG CLIN: HCPCS | Performed by: NURSE PRACTITIONER

## 2023-03-09 PROCEDURE — G8420 CALC BMI NORM PARAMETERS: HCPCS | Performed by: NURSE PRACTITIONER

## 2023-03-09 PROCEDURE — 83036 HEMOGLOBIN GLYCOSYLATED A1C: CPT | Performed by: NURSE PRACTITIONER

## 2023-03-09 PROCEDURE — 3074F SYST BP LT 130 MM HG: CPT | Performed by: NURSE PRACTITIONER

## 2023-03-09 PROCEDURE — 3078F DIAST BP <80 MM HG: CPT | Performed by: NURSE PRACTITIONER

## 2023-03-09 PROCEDURE — 1036F TOBACCO NON-USER: CPT | Performed by: NURSE PRACTITIONER

## 2023-03-09 PROCEDURE — 99213 OFFICE O/P EST LOW 20 MIN: CPT | Performed by: NURSE PRACTITIONER

## 2023-03-09 PROCEDURE — 3046F HEMOGLOBIN A1C LEVEL >9.0%: CPT | Performed by: NURSE PRACTITIONER

## 2023-03-09 RX ORDER — LIDOCAINE 50 MG/G
1 PATCH TOPICAL DAILY
Qty: 10 PATCH | Refills: 0 | Status: SHIPPED | OUTPATIENT
Start: 2023-03-09 | End: 2023-03-19

## 2023-03-09 RX ORDER — DAPAGLIFLOZIN 10 MG/1
10 TABLET, FILM COATED ORAL EVERY MORNING
Qty: 90 TABLET | Refills: 5 | Status: SHIPPED | OUTPATIENT
Start: 2023-03-09

## 2023-03-09 RX ORDER — INSULIN GLARGINE 100 [IU]/ML
INJECTION, SOLUTION SUBCUTANEOUS
Qty: 15 ML | Refills: 5 | Status: SHIPPED | OUTPATIENT
Start: 2023-03-09

## 2023-03-09 RX ORDER — INSULIN LISPRO 100 [IU]/ML
6 INJECTION, SOLUTION INTRAVENOUS; SUBCUTANEOUS
Qty: 4 EACH | Refills: 5 | Status: SHIPPED | OUTPATIENT
Start: 2023-03-09

## 2023-03-09 RX ORDER — ONDANSETRON 4 MG/1
TABLET, FILM COATED ORAL EVERY 8 HOURS PRN
COMMUNITY
Start: 2019-07-16

## 2023-03-09 ASSESSMENT — PATIENT HEALTH QUESTIONNAIRE - PHQ9
2. FEELING DOWN, DEPRESSED OR HOPELESS: 0
SUM OF ALL RESPONSES TO PHQ QUESTIONS 1-9: 0
1. LITTLE INTEREST OR PLEASURE IN DOING THINGS: 0
SUM OF ALL RESPONSES TO PHQ QUESTIONS 1-9: 0
SUM OF ALL RESPONSES TO PHQ QUESTIONS 1-9: 0
SUM OF ALL RESPONSES TO PHQ9 QUESTIONS 1 & 2: 0
SUM OF ALL RESPONSES TO PHQ QUESTIONS 1-9: 0
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
## 2023-05-23 ENCOUNTER — HOSPITAL ENCOUNTER (OUTPATIENT)
Dept: DIABETES SERVICES | Age: 43
Setting detail: THERAPIES SERIES
Discharge: HOME | End: 2023-05-23
Payer: COMMERCIAL

## 2023-05-23 VITALS — BODY MASS INDEX: 23.08 KG/M2 | WEIGHT: 138.67 LBS

## 2023-05-23 DIAGNOSIS — E11.00 TYPE 2 DIABETES MELLITUS WITH HYPEROSMOLARITY WITHOUT COMA, WITHOUT LONG-TERM CURRENT USE OF INSULIN (HCC): Primary | ICD-10-CM

## 2023-05-23 PROCEDURE — 97802 MEDICAL NUTRITION INDIV IN: CPT

## 2023-05-23 NOTE — PROGRESS NOTES
[phone] phone number - for information and referral to [facility] DAVID SHARMA  Clinically  [address], FOOT, CARDIAC, WOUND, WEIGHT MANAGEMENT        []Other  Carlos Hawkins, RD, LD CDE

## 2023-06-06 ENCOUNTER — HOSPITAL ENCOUNTER (OUTPATIENT)
Dept: DIABETES SERVICES | Age: 43
Setting detail: THERAPIES SERIES
Discharge: HOME | End: 2023-06-06
Payer: COMMERCIAL

## 2023-06-06 DIAGNOSIS — E11.00 TYPE 2 DIABETES MELLITUS WITH HYPEROSMOLARITY WITHOUT COMA, WITHOUT LONG-TERM CURRENT USE OF INSULIN (HCC): Primary | ICD-10-CM

## 2023-06-06 PROCEDURE — G0108 DIAB MANAGE TRN  PER INDIV: HCPCS

## 2023-06-06 NOTE — PROGRESS NOTES
for appt, gave him a glucerna and snack bar. When he came to [facility] office bs 126 and bp within target range. Pt felt better as appt went on. Feels more confident when knows how bs doing. 2 What to eat - Food groups, When to eat - timing of meals and snacks, and How much to eat - portions control. calories/ day   CHO choices/ meal   CHO choices/  day   grams of protein /day   gram of fat /day      Now in his own apt and looking    10/28/22 JW MNT session. Pt now living on own. Apt on [address] 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 Uses very little oil. Trying to eat a variety of foods and meals are regular w Br, Estefani (usually something light) and Michael within 5 hour time spans. Weight stable, reports eating enough, getting enough food. Pt feels diabetes got him on a healthier track. 5/23/23 JW Pt has gf whom he'll stay with a few nights a week. Diabetes runs in her family so she has familiarity w SMBG and insulin injections. Very supportive of pt and reminds him of habits. Continues to keep items with him in back pack. Pt or gf's grandma cook. Pt packs food to take to work. Has been working 2 jobs- [employer] and [employer]. Reviewed basics of meal planning with dinner plate guide. Correctly read food labels & demonstrate CHO counting & portion control with personalized meal plan. Identify dining out strategies, & dietary sick day guidelines. 1 3/17/22 JW 10/28/22   2 Basic carbs on labels   Incorporating physical activity into lifestyle:   Verbalize effect of exercise on blood glucose levels; benefits of regular exercise; safety considerations; contraindications; maintenance of activity.    1 3/1/22rs   3 Prior was very active when he working - now less active -  At [employer] and working on getting class done  4/19/22rs - plan to get a new bike - [employer] shared     7/6/22use did get new bike for transportation to and from work and active at work    6/6/23 -

## 2023-09-13 DIAGNOSIS — Z79.4 TYPE 2 DIABETES MELLITUS WITH HYPEROSMOLARITY WITHOUT COMA, WITH LONG-TERM CURRENT USE OF INSULIN (HCC): ICD-10-CM

## 2023-09-13 DIAGNOSIS — E11.00 TYPE 2 DIABETES MELLITUS WITH HYPEROSMOLARITY WITHOUT COMA, WITH LONG-TERM CURRENT USE OF INSULIN (HCC): ICD-10-CM

## 2023-09-13 RX ORDER — CALCIUM CITRATE/VITAMIN D3 200MG-6.25
TABLET ORAL
Qty: 100 STRIP | Refills: 1 | Status: SHIPPED | OUTPATIENT
Start: 2023-09-13

## 2023-09-14 ENCOUNTER — OFFICE VISIT (OUTPATIENT)
Dept: PRIMARY CARE CLINIC | Age: 43
End: 2023-09-14
Payer: COMMERCIAL

## 2023-09-14 VITALS
HEART RATE: 90 BPM | HEIGHT: 65 IN | DIASTOLIC BLOOD PRESSURE: 91 MMHG | BODY MASS INDEX: 24.19 KG/M2 | WEIGHT: 145.2 LBS | OXYGEN SATURATION: 98 % | SYSTOLIC BLOOD PRESSURE: 140 MMHG

## 2023-09-14 DIAGNOSIS — Z79.4 TYPE 2 DIABETES MELLITUS WITH HYPEROSMOLARITY WITHOUT COMA, WITH LONG-TERM CURRENT USE OF INSULIN (HCC): Primary | ICD-10-CM

## 2023-09-14 DIAGNOSIS — Z13.9 DUE FOR SCREENING: ICD-10-CM

## 2023-09-14 DIAGNOSIS — E11.00 TYPE 2 DIABETES MELLITUS WITH HYPEROSMOLARITY WITHOUT COMA, WITH LONG-TERM CURRENT USE OF INSULIN (HCC): Primary | ICD-10-CM

## 2023-09-14 DIAGNOSIS — I10 ESSENTIAL HYPERTENSION: ICD-10-CM

## 2023-09-14 LAB — HBA1C MFR BLD: 8.6 %

## 2023-09-14 PROCEDURE — G8427 DOCREV CUR MEDS BY ELIG CLIN: HCPCS | Performed by: NURSE PRACTITIONER

## 2023-09-14 PROCEDURE — G8420 CALC BMI NORM PARAMETERS: HCPCS | Performed by: NURSE PRACTITIONER

## 2023-09-14 PROCEDURE — 1036F TOBACCO NON-USER: CPT | Performed by: NURSE PRACTITIONER

## 2023-09-14 PROCEDURE — 3080F DIAST BP >= 90 MM HG: CPT | Performed by: NURSE PRACTITIONER

## 2023-09-14 PROCEDURE — 3052F HG A1C>EQUAL 8.0%<EQUAL 9.0%: CPT | Performed by: NURSE PRACTITIONER

## 2023-09-14 PROCEDURE — 3077F SYST BP >= 140 MM HG: CPT | Performed by: NURSE PRACTITIONER

## 2023-09-14 PROCEDURE — 99214 OFFICE O/P EST MOD 30 MIN: CPT | Performed by: NURSE PRACTITIONER

## 2023-09-14 PROCEDURE — 2022F DILAT RTA XM EVC RTNOPTHY: CPT | Performed by: NURSE PRACTITIONER

## 2023-09-14 PROCEDURE — 83036 HEMOGLOBIN GLYCOSYLATED A1C: CPT | Performed by: NURSE PRACTITIONER

## 2023-09-14 SDOH — ECONOMIC STABILITY: FOOD INSECURITY: WITHIN THE PAST 12 MONTHS, THE FOOD YOU BOUGHT JUST DIDN'T LAST AND YOU DIDN'T HAVE MONEY TO GET MORE.: NEVER TRUE

## 2023-09-14 SDOH — ECONOMIC STABILITY: HOUSING INSECURITY
IN THE LAST 12 MONTHS, WAS THERE A TIME WHEN YOU DID NOT HAVE A STEADY PLACE TO SLEEP OR SLEPT IN A SHELTER (INCLUDING NOW)?: NO

## 2023-09-14 SDOH — ECONOMIC STABILITY: FOOD INSECURITY: WITHIN THE PAST 12 MONTHS, YOU WORRIED THAT YOUR FOOD WOULD RUN OUT BEFORE YOU GOT MONEY TO BUY MORE.: NEVER TRUE

## 2023-09-14 SDOH — ECONOMIC STABILITY: INCOME INSECURITY: HOW HARD IS IT FOR YOU TO PAY FOR THE VERY BASICS LIKE FOOD, HOUSING, MEDICAL CARE, AND HEATING?: NOT HARD AT ALL

## 2023-09-14 ASSESSMENT — ENCOUNTER SYMPTOMS
DIARRHEA: 0
PHOTOPHOBIA: 0
NAUSEA: 0
CHEST TIGHTNESS: 0
SORE THROAT: 0
ABDOMINAL PAIN: 0
VOMITING: 0
SINUS PAIN: 0
COUGH: 0
SINUS PRESSURE: 0
SHORTNESS OF BREATH: 0
BACK PAIN: 0
COLOR CHANGE: 0

## 2023-09-14 NOTE — PROGRESS NOTES
Lisa Burch (:  1980) is a 36 y.o. male,Established patient, here for evaluation of the following chief complaint(s):  Follow-up (3mo//Body tightness all over) and Diabetes         ASSESSMENT/PLAN:  1. Type 2 diabetes mellitus with hyperosmolarity without coma, with long-term current use of insulin (HCC)  -     POCT glycosylated hemoglobin (Hb A1C)  -     Microalbumin, Ur  -     Basic Metabolic Panel; Future  -     Lipid Panel; Future  -     empagliflozin (JARDIANCE) 10 MG tablet; Take 1 tablet by mouth daily, Disp-90 tablet, R-17 tablets providedSample  -     empagliflozin (JARDIANCE) 10 MG tablet; Take 1 tablet by mouth daily, Disp-90 tablet, R-0Normal  2. Essential hypertension  3. Due for screening  -     HIV Screen; Future      Return in about 3 months (around 2023). Subjective   SUBJECTIVE/OBJECTIVE:  HPI    Patient is here today for routine follow-up. Overall, states he is doing well. Complains may be an issue as he states he was taking Deloras Laura and then states he is not sure if his insurance is covering the medication. States he intermittently is compliant with blood sugar checks. Fasting blood sugar this morning was 200. In office A1c noted to be 8.6. Endorses compliance with long-acting and mealtime insulin along with twice daily metformin. Will switch from Deloras Laura to Fiez given the potential issues with insurance coverage. Patient provided with 7-day sample of Jardiance and additional 30-day prescription sent over to patient's pharmacy. Patient will be due for repeat A1c in 3 months. We will hold off on adjusting Lantus so long as patient is able to start Jardiance. Blood pressure is mildly elevated today. Patient states he has not taken his blood pressure medication. Denies any headache/dizziness or lightheadedness. Denies any chest pain/tightness or shortness of breath. Discussed importance of blood pressure medication compliance.   Patient is also overdue for

## 2023-09-20 ENCOUNTER — HOSPITAL ENCOUNTER (OUTPATIENT)
Dept: DIABETES SERVICES | Age: 43
Setting detail: THERAPIES SERIES
Discharge: HOME | End: 2023-09-20

## 2023-09-20 ENCOUNTER — TELEPHONE (OUTPATIENT)
Dept: PRIMARY CARE CLINIC | Age: 43
End: 2023-09-20

## 2023-09-20 DIAGNOSIS — E11.00 TYPE 2 DIABETES MELLITUS WITH HYPEROSMOLARITY WITHOUT COMA, WITHOUT LONG-TERM CURRENT USE OF INSULIN (HCC): Primary | ICD-10-CM

## 2023-09-20 DIAGNOSIS — Z79.4 TYPE 2 DIABETES MELLITUS WITH HYPERGLYCEMIA, WITH LONG-TERM CURRENT USE OF INSULIN (HCC): ICD-10-CM

## 2023-09-20 DIAGNOSIS — E11.65 TYPE 2 DIABETES MELLITUS WITH HYPERGLYCEMIA, WITH LONG-TERM CURRENT USE OF INSULIN (HCC): ICD-10-CM

## 2023-09-20 PROCEDURE — 97803 MED NUTRITION INDIV SUBSEQ: CPT

## 2023-09-20 NOTE — TELEPHONE ENCOUNTER
----- Message from Teresatariq [place], [place] sent at 9/20/2023  9:29 AM EDT -----  Subject: Message to Provider    QUESTIONS  Information for Provider? Pt called and stated that he would like to let   Delmar Collado know that he will not be able to get his medication because he   will not have insurance. Pt would like for Ruma Sharif to give him a call back   ---------------------------------------------------------------------------  --------------  [address]  [phone]; Do not leave any message, patient will call back for answer  ---------------------------------------------------------------------------  --------------  SCRIPT ANSWERS  Relationship to Patient?  Self

## 2023-09-27 ENCOUNTER — APPOINTMENT (OUTPATIENT)
Dept: DIABETES SERVICES | Age: 43
End: 2023-09-27

## 2025-08-26 ENCOUNTER — OFFICE VISIT (OUTPATIENT)
Dept: PRIMARY CARE CLINIC | Age: 45
End: 2025-08-26

## 2025-08-26 VITALS
OXYGEN SATURATION: 98 % | HEART RATE: 96 BPM | BODY MASS INDEX: 22.73 KG/M2 | HEIGHT: 65 IN | TEMPERATURE: 98.1 F | SYSTOLIC BLOOD PRESSURE: 119 MMHG | WEIGHT: 136.4 LBS | DIASTOLIC BLOOD PRESSURE: 86 MMHG

## 2025-08-26 DIAGNOSIS — I10 ESSENTIAL HYPERTENSION: ICD-10-CM

## 2025-08-26 DIAGNOSIS — E11.00 TYPE 2 DIABETES MELLITUS WITH HYPEROSMOLARITY WITHOUT COMA, WITH LONG-TERM CURRENT USE OF INSULIN (HCC): Primary | ICD-10-CM

## 2025-08-26 DIAGNOSIS — Z79.4 TYPE 2 DIABETES MELLITUS WITH HYPEROSMOLARITY WITHOUT COMA, WITH LONG-TERM CURRENT USE OF INSULIN (HCC): Primary | ICD-10-CM

## 2025-08-26 PROCEDURE — 3079F DIAST BP 80-89 MM HG: CPT | Performed by: NURSE PRACTITIONER

## 2025-08-26 PROCEDURE — 99213 OFFICE O/P EST LOW 20 MIN: CPT | Performed by: NURSE PRACTITIONER

## 2025-08-26 PROCEDURE — 3074F SYST BP LT 130 MM HG: CPT | Performed by: NURSE PRACTITIONER

## 2025-08-26 RX ORDER — GLUCOSAMINE HCL/CHONDROITIN SU 500-400 MG
CAPSULE ORAL
Qty: 100 STRIP | Refills: 5 | Status: SHIPPED | OUTPATIENT
Start: 2025-08-26

## 2025-08-26 RX ORDER — GLIPIZIDE 5 MG/1
5 TABLET ORAL 2 TIMES DAILY
Qty: 60 TABLET | Refills: 5 | Status: SHIPPED | OUTPATIENT
Start: 2025-08-26

## 2025-08-26 RX ORDER — GLUCOSAM/CHON-MSM1/C/MANG/BOSW 500-416.6
TABLET ORAL
Qty: 100 EACH | Refills: 3 | Status: SHIPPED | OUTPATIENT
Start: 2025-08-26

## 2025-09-03 ASSESSMENT — ENCOUNTER SYMPTOMS
SINUS PRESSURE: 0
DIARRHEA: 0
ABDOMINAL PAIN: 0
VOMITING: 0
COUGH: 0
CHEST TIGHTNESS: 0
SINUS PAIN: 0
BACK PAIN: 0
PHOTOPHOBIA: 0
SORE THROAT: 0
SHORTNESS OF BREATH: 0
NAUSEA: 0
COLOR CHANGE: 0